# Patient Record
Sex: FEMALE | Race: WHITE | Employment: OTHER | URBAN - METROPOLITAN AREA
[De-identification: names, ages, dates, MRNs, and addresses within clinical notes are randomized per-mention and may not be internally consistent; named-entity substitution may affect disease eponyms.]

---

## 2018-06-14 ENCOUNTER — TRANSFERRED RECORDS (OUTPATIENT)
Dept: HEALTH INFORMATION MANAGEMENT | Facility: CLINIC | Age: 78
End: 2018-06-14

## 2018-06-27 ENCOUNTER — TRANSFERRED RECORDS (OUTPATIENT)
Dept: HEALTH INFORMATION MANAGEMENT | Facility: CLINIC | Age: 78
End: 2018-06-27

## 2018-06-28 ENCOUNTER — TRANSFERRED RECORDS (OUTPATIENT)
Dept: HEALTH INFORMATION MANAGEMENT | Facility: CLINIC | Age: 78
End: 2018-06-28

## 2018-06-28 ENCOUNTER — PRE VISIT (OUTPATIENT)
Dept: OTOLARYNGOLOGY | Facility: CLINIC | Age: 78
End: 2018-06-28

## 2018-06-28 NOTE — TELEPHONE ENCOUNTER
Date of appointment: 18   Diagnosis/reason for appointment: Thyroid Cancer  Referring provider/facility: Dr. Salas/[facility]  Who called:    Recent Studies  [facility] is mailing images and slides  Pathology:  Labs:  Previous chemo/radiation (if known):    Records requested/received from:    Additional information:

## 2018-07-02 PROCEDURE — 00000346 ZZHCL STATISTIC REVIEW OUTSIDE SLIDES TC 88321: Performed by: OTOLARYNGOLOGY

## 2018-07-03 LAB — COPATH REPORT: NORMAL

## 2018-07-16 ENCOUNTER — DOCUMENTATION ONLY (OUTPATIENT)
Dept: OTOLARYNGOLOGY | Facility: CLINIC | Age: 78
End: 2018-07-16

## 2018-07-16 ENCOUNTER — OFFICE VISIT (OUTPATIENT)
Dept: OTOLARYNGOLOGY | Facility: CLINIC | Age: 78
End: 2018-07-16
Payer: COMMERCIAL

## 2018-07-16 VITALS — HEIGHT: 61 IN | WEIGHT: 164 LBS | BODY MASS INDEX: 30.96 KG/M2

## 2018-07-16 DIAGNOSIS — C73 HURTHLE CELL CARCINOMA (H): ICD-10-CM

## 2018-07-16 DIAGNOSIS — C73 THYROID CANCER (H): Primary | ICD-10-CM

## 2018-07-16 RX ORDER — TIOTROPIUM BROMIDE 18 UG/1
18 CAPSULE ORAL; RESPIRATORY (INHALATION) PRN
COMMUNITY
Start: 2017-12-11

## 2018-07-16 RX ORDER — POTASSIUM CHLORIDE 750 MG/1
10 TABLET, EXTENDED RELEASE ORAL EVERY MORNING
COMMUNITY
Start: 2017-12-11

## 2018-07-16 RX ORDER — ATORVASTATIN CALCIUM 80 MG/1
80 TABLET, FILM COATED ORAL AT BEDTIME
COMMUNITY
Start: 2017-12-11

## 2018-07-16 RX ORDER — METOPROLOL TARTRATE 50 MG
50 TABLET ORAL 2 TIMES DAILY
COMMUNITY
Start: 2018-05-31

## 2018-07-16 RX ORDER — AMLODIPINE BESYLATE 5 MG/1
5 TABLET ORAL EVERY MORNING
COMMUNITY
Start: 2017-12-11

## 2018-07-16 RX ORDER — ALBUTEROL SULFATE 90 UG/1
AEROSOL, METERED RESPIRATORY (INHALATION) PRN
COMMUNITY
Start: 2018-03-05

## 2018-07-16 RX ORDER — LEVOTHYROXINE SODIUM 88 UG/1
88 TABLET ORAL EVERY MORNING
COMMUNITY
Start: 2017-12-11

## 2018-07-16 RX ORDER — FUROSEMIDE 40 MG
40 TABLET ORAL EVERY MORNING
COMMUNITY
Start: 2018-04-27

## 2018-07-16 ASSESSMENT — PAIN SCALES - GENERAL: PAINLEVEL: NO PAIN (0)

## 2018-07-16 NOTE — NURSING NOTE
Teaching Flowsheet - ENT   Relevant Diagnosis: Thyroid nodule  Teaching Topic: hemithyroidectomy   Person(s) involved in teaching: Patient and granddaughter        Motivation Level:  Asks Questions:   Yes  Eager to Learn:   Yes  Cooperative:   Yes  Receptive (willing/able to accept information):   Yes  Comments: Reviewed pre-op H and P,  NPO prior to  surgery,  pre-op scrub (given Hibiclens)  Reviewed post-op  cares , activity and pain.     Patient demonstrates understanding of the following:  Reason for the appointment, diagnosis and treatment plan:   Yes  Knowledge of proper use of medications and conditions for which they are ordered (with special attention to potential side effects or drug interactions):  stop aspirin products 1 week before surgery Yes  Which situations necessitate calling provider and whom to contact:   Yes  Nutritional needs and diet plan:   Yes  Pain management techniques:   Yes  Patient instructed on hand hygiene:  Yes  How and/when to access community resources:   Yes     Infection Prevention:  Patient   demonstrates understanding of the following:  Surgical procedure site care taught Yes  Signs and symptoms of infection taught Yes  Wound care taught Yes  Instructional Materials Used/Given: pre- op booklet,verbal  Instruction.    Elizabeth Lim, RN, BSN

## 2018-07-16 NOTE — PROGRESS NOTES
Patient scheduled for surgery on 8/7/18 [facility] with Dr Herrera while in ENT Clinic today.  Preop teaching done by the RN Coordinator. Surgery packet and soap given. PAC appointment made for 7/24/18 @ 9:30am.

## 2018-07-16 NOTE — MR AVS SNAPSHOT
After Visit Summary   7/16/2018    Hanna Mcmahon    MRN: [id number]           Patient Information     Date Of Birth          1940        Visit Information        Provider Department      7/16/2018 10:20 AM Sophie Herrera MD [facility] Ear Nose and Throat        Today's Diagnoses     Thyroid cancer (H)    -  1    Hurthle cell carcinoma (H)          Care Instructions    1. Patient is scheduled for surgery on 8/7/18. You need to arrive at the hospital at 11:00am.   2. You are scheduled for PAC appointment on 7/24 at 9:30am.   3. Patient to avoid blood thinning medications 1 week prior to surgery (Ibuprofen, Aleve, Aspirin, etc.)   4. Patient to review contents within the surgical packet & use the antiseptic scrub as directed.   5. Patient to call the ENT clinic with further questions or concerns: [phone].              Follow-ups after your visit        Your next 10 appointments already scheduled     Jul 24, 2018  9:30 AM CDT   (Arrive by 9:15 AM)   PAC EVALUATION with Beatris Arita PA-C   [facility] ([facility] [place])    [address] [place] [id number]   [phone]            Jul 24, 2018 10:30 AM CDT   (Arrive by 10:15 AM)   PAC RN ASSESSMENT with  Pac Rn   [facility] [place] ([facility] [place])    [address] [place] [address]   [phone]            Jul 24, 2018 11:00 AM CDT   (Arrive by 10:45 AM)   PAC Anesthesia Consult with  Pac Anesthesiologist   [facility] [place] ([facility] [place])    [address] [place] [address]   [phone]            Aug 07, 2018   Procedure with Sophie Herrera MD   [facility] [place], Same Day Surgery (--)    [address] [facility] [address]   [phone]            Aug 17, 2018  3:00 PM CDT   (Arrive by 2:45 PM)   Return Visit  "with Sophie Herrera MD   [facility] Ear Nose and Throat ([facility] [place])    [address] [facility]  [address] [facility] [address] [phone]              Who to contact     Please call your clinic at [phone] to:    Ask questions about your health    Make or cancel appointments    Discuss your medicines    Learn about your test results    Speak to your doctor            Additional Information About Your Visit        [employer] Information     [employer] gives you secure access to your electronic health record. If you see a primary care provider, you can also send messages to your care team and make appointments. If you have questions, please call your primary care clinic.  If you do not have a primary care provider, please call [phone] and they will assist you.      Weeleo is an electronic gateway that provides easy, online access to your medical records. With [employer], you can request a clinic appointment, read your test results, renew a prescription or communicate with your care team.     To access your existing account, please contact your [facility] or call [phone] for assistance.        Care EveryWhere ID     This is your Care EveryWhere ID. This could be used by other organizations to access your [place] medical records  [id number]        Your Vitals Were     Height BMI (Body Mass Index)                1.549 m (5' 1\") 30.99 kg/m2           Blood Pressure from Last 3 Encounters:   No data found for BP    Weight from Last 3 Encounters:   07/16/18 74.4 kg (164 lb)              We Performed the Following     IMAGESTREAM RECORDING ORDER     Sheila-Operative Worksheet (Head & Neck)        Primary Care Provider Office Phone # Fax #    Jw Jensen [phone]       [address] [address]        Equal Access to Services     FAHAD CISNEROS AH: Hadii mirian Mccormick, frank " rodriguez cardoza jessiedanica chuaservando [facility] [phone].    ATENCIÓN: Si marcie jenkins, tiene a webster disposición servicios gratuitos de asistencia lingüística. Lori al [phone].    We comply with applicable federal civil rights laws and [place] laws. We do not discriminate on the basis of race, color, national origin, age, disability, sex, sexual orientation, or gender identity.            Thank you!     Thank you for choosing [facility] EAR NOSE AND THROAT  for your care. Our goal is always to provide you with excellent care. Hearing back from our patients is one way we can continue to improve our services. Please take a few minutes to complete the written survey that you may receive in the mail after your visit with us. Thank you!             Your Updated Medication List - Protect others around you: Learn how to safely use, store and throw away your medicines at www.disposemymeds.org.          This list is accurate as of 7/16/18 11:59 PM.  Always use your most recent med list.                   Brand Name Dispense Instructions for use Diagnosis    amLODIPine 5 MG tablet    NORVASC     Take 5 mg by mouth        atorvastatin 80 MG tablet    LIPITOR     Take 80 mg by mouth        furosemide 40 MG tablet    LASIX     Take 40 mg by mouth        levothyroxine 88 MCG tablet    SYNTHROID/LEVOTHROID     Take 88 mcg by mouth        metoprolol tartrate 50 MG tablet    LOPRESSOR     Take 50 mg by mouth        mometasone-formoterol 100-5 MCG/ACT oral inhaler    DULERA     Inhale 2 puffs into the lungs        potassium chloride SA 10 MEQ CR tablet    K-DUR/KLOR-CON M     Take 10 mEq by mouth        PROAIR  (90 Base) MCG/ACT Inhaler   Generic drug:  albuterol           SM CALCIUM 500/VITAMIN D3 500-400 MG-UNIT Tabs per tablet   Generic drug:  calcium carbonate-vitamin D      Take 1 tablet by mouth        study - aspirin vs placebo 1 tablet tablet    IDS #5239     Take 81 mg by mouth         tiotropium 18 MCG capsule    SPIRIVA     Inhale 18 mcg into the lungs

## 2018-07-16 NOTE — LETTER
7/16/2018       RE: Hanna Mcmahon  [address]  [address]  [place] [facility] [address]     Dear Colleague,    Thank you for referring your patient, Hanna Mcmahon, to the [facility] EAR NOSE AND THROAT at [facility]. Please see a copy of my visit note below.    Dear Dr. Salas:    I had the pleasure of meeting Hanna Mcmahon in consultation today at the [facility] at your request.     History of Present Illness:   Hanna Mcmahon is a 78-year-old woman who is referred for evaluation of a left thyroid nodule.  She says this was incidentally found by her primary care physician on physical exam.  She had an ultrasound performed locally which showed a 4.5 cm nodule in the left thyroid gland.  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  She saw a general surgeon and ENT locally with a recommendation for a left hemithyroidectomy.  However she was recommended to undergo surgery here at the [place] due to her multiple medical comorbidities.  She has no symptoms from her thyroid tumor.  She has no changes in her voice, no difficulty with her breathing and, no problems with her swallowing.  She denies any history of radiation her neck.  She has no family history positive for thyroid cancers.    Past medical history: CML treated with Procrit injections, aortic stenosis, CAD, MI in 1985, hypertension, hyperlipidemia, polymyalgia rheumatica, appendectomy, cholecystectomy, stent in 2002 on chronic 81 mg aspirin.  She does have a history of significant bleeding following her stent placement.      Social history: Quit smoking 10 years ago was previously 1 pack per day smoker ×10 years, No chewing tobacco use, quit alcohol.  She is retired and previously made pizza for living    MEDICATIONS:     Current Outpatient Prescriptions   Medication Sig Dispense Refill     albuterol (PROAIR HFA) 108 (90  Base) MCG/ACT Inhaler        amLODIPine (NORVASC) 5 MG tablet Take 5 mg by mouth       atorvastatin (LIPITOR) 80 MG tablet Take 80 mg by mouth       calcium carbonate-vitamin D (SM CALCIUM 500/VITAMIN D3) 500-400 MG-UNIT TABS per tablet Take 1 tablet by mouth       furosemide (LASIX) 40 MG tablet Take 40 mg by mouth       levothyroxine (SYNTHROID/LEVOTHROID) 88 MCG tablet Take 88 mcg by mouth       metoprolol tartrate (LOPRESSOR) 50 MG tablet Take 50 mg by mouth       mometasone-formoterol (DULERA) 100-5 MCG/ACT oral inhaler Inhale 2 puffs into the lungs       potassium chloride SA (K-DUR/KLOR-CON M) 10 MEQ CR tablet Take 10 mEq by mouth       study - aspirin vs placebo (IDS #5239) 1 tablet tablet Take 81 mg by mouth       tiotropium (SPIRIVA) 18 MCG capsule Inhale 18 mcg into the lungs         ALLERGIES:    Allergies   Allergen Reactions     Benazepril Swelling       HABITS/SOCIAL HISTORY:   Quit smoking 10 years ago was previously 1 pack per day smoker ×10 years  No chewing tobacco use  Quit alcohol.    She is retired and previously made [employer] for living    Social History     Social History     Marital status:      Spouse name: N/A     Number of children: N/A     Years of education: N/A     Occupational History     Not on file.     Social History Main Topics     Smoking status: Former Smoker     Smokeless tobacco: Never Used     Alcohol use Not on file     Drug use: Not on file     Sexual activity: Not on file     Other Topics Concern     Not on file     Social History Narrative     No narrative on file       PAST MEDICAL HISTORY: History reviewed. No pertinent past medical history.     PAST SURGICAL HISTORY: History reviewed. No pertinent surgical history.    FAMILY HISTORY:  History reviewed. No pertinent family history.    REVIEW OF SYSTEMS:  12 point ROS was negative other than the symptoms noted above in the HPI.  Patient Supplied Answers to Review of Systems  [facility] ENT ROS 7/16/2018   Constitutional  "Weight loss   Ears, Nose, Throat Ear pain   Musculoskeletal Back pain, Neck pain         PHYSICAL EXAMINATION:   Ht 1.549 m (5' 1\")  Wt 74.4 kg (164 lb)  BMI 30.99 kg/m2   Appearance:   normal; NAD, age-appropriate appearance, well-developed, nobese   Communication:   normal; communicates verbally, normal voice quality   Head/Face:   inspection -  Normal; no scars or visible lesions   Salivary glands -  Normal size, no tenderness, swelling, or palpable masses   Facial strength -  Normal and symmetric bilateral; H/B I/VI   Skin:  normal, no rash   Ocular Motility:  normal occular movements   Ears:  auricle (AD) -  normal  EAC (AD) -  normal  TM (AD) -  Normal, no effusion  auricle (AS) -  normal  EAC (AS) -  normal  TM (AS) -  Normal, no effusion  Normal clinical speech reception   Nose:  Ext. inspection -  Normal  Internal Inspection -  Normal mucosa, septum, and turbinates   Nasopharynx:  normal mucosa, no masses   Oral Cavity:  lips -  Normal mucosa, oral competence, and stoma size   Hard palate, buccal, floor of mouth mucosa normal   Tongue - normal movement, no lesions   Oropharynx:  mucosa -  Normal, no lesions  soft palate -  Normal, no lesions, no asymmetry, normal elevation   Hypopharynx:  Normal pyriform sinus and pharyngeal wall mucosa   No pooled secretions   Larynx:  Epiglottis, false vocal cord, true vocal cord normal in appearance, bilaterally mobile cords    Neck: No visible mass or asymmetry   Normal palpation, no tenderness, no tracheal deviation  thyroid -  Palpable nodule on left   Normal range of motion   Lymphatic:  no abnormal nodes   Cardiovascular:  warm, pink, well-perfused extremities without swelling, tenderness, or edema   Respiratory:  Normal respiratory effort, no stridor   Neuro/Psych.:  mood/affect -  normal  mental status -  normal  cranial nerves -  normal        PROCEDURES:   Flexible fiberoptic laryngoscopy: Scope exam was indicated due to history of thyroid nodule. Verbal " consent was obtained. The nasal cavity was prepped with an aerosolized solution of topical anesthetic and vasoconstrictive agent. The scope was passed through the anterior nasal cavity and advanced. Inspection of the nasopharynx revealed no gross abnormality.  The scope was advanced into the posterior oropharynx and back towards the base of tongue.  There are no obvious masses or mucosal lesions.  The vallecula is clear.  The lingual and laryngeal surface of the epiglottis have no masses or mucosal lesions.  The vocal cords are mobile bilaterally with full abduction and adduction.  The piriform sinuses are clear.The airway is patent. Procedure tolerated well with no immediate complications noted.      RESULTS REVIEWED:   I reviewed the referral records from the general surgeon and ENT (Dr Elizabeth and Dr Salas) in [facility] which are summarized above    U/S Left thyroid: 4.5 cm nodule      FINAL DIAGNOSIS:   CASE FROM [place], [place] ([id number], OBTAINED   06/14/2018):   Thyroid, Left, Ultrasound-guided fine needle aspiration:   - Morphologically consistent with a follicular cell neoplasm with Hurthle   cell features     COMMENT:   We agree with the outside diagnosis.  The aspirates are hypercellular with    atypical cells with abundant   granular cytoplasm (Hurthle cell features).  There is no colloid noted in   the background.  This is a category   4 in the [place] System for Reporting Thyroid Cytopathology which is   consistent with follicular neoplasm or   suspicious for a follicular neoplasm.  Definitive evaluation for   malignancy is not possible via cytopathology.       IMPRESSION AND PLAN:   Hanna Mcmahon is a 78-year-old woman with a left-sided thyroid nodule consistent with a follicular cell lesion with Hurthle cell features.  I discussed with her that recommendations would be for surgical excision with a left hemithyroidectomy.  I discussed the risks of the procedure.  We discussed the  planned incision.  She understands she will have a drain in place postoperatively.  I discussed the risk to the recurrent laryngeal nerve as well as to the parathyroids.  Certainly if the blood supply is compromised to the parathyroids we can perform a reimplantation.  Any injury or temporary paresis of the recurrent laryngeal nerve could cause swallowing or voice issues. She understands the risk of scarring, bleeding, infection.  She understands that if there is expected findings in the final pathology she could have to have a total thyroidectomy at a separate procedure.  This surgery will be done as an outpatient procedure.  She will need to have preoperative clearance prior to surgery which we will do in our PAC clinic.  I think it be most appropriate to perform her surgery in the main operating room given her medical history.  She will need to be off aspirin prior to surgery.    Thank you very much for the opportunity to participate in the care of your patient.      Sophie Herrera M.D.  Otolaryngology- Head & Neck Surgery        CC:  Jw Jensen  [address] [address]      Juan Carlos Salas MD  [address], [address]  [place] [address]      Zay Elizabeth MD  General Surgery  [place], [place]      Again, thank you for allowing me to participate in the care of your patient.      Sincerely,    Sophie Herrera MD

## 2018-07-16 NOTE — PATIENT INSTRUCTIONS
1. Patient is scheduled for surgery on 8/7/18. You need to arrive at the hospital at 11:00am.   2. You are scheduled for PAC appointment on 7/24 at 9:30am.   3. Patient to avoid blood thinning medications 1 week prior to surgery (Ibuprofen, Aleve, Aspirin, etc.)   4. Patient to review contents within the surgical packet & use the antiseptic scrub as directed.   5. Patient to call the ENT clinic with further questions or concerns: [phone].

## 2018-07-16 NOTE — PROGRESS NOTES
Dear Dr. Salas:    I had the pleasure of meeting Hanna Mcmahon in consultation today at the [facility] at your request.     History of Present Illness:   Hanna Mcmahon is a 78-year-old woman who is referred for evaluation of a left thyroid nodule.  She says this was incidentally found by her primary care physician on physical exam.  She had an ultrasound performed locally which showed a 4.5 cm nodule in the left thyroid gland.  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  She saw a general surgeon and ENT locally with a recommendation for a left hemithyroidectomy.  However she was recommended to undergo surgery here at the [facility] due to her multiple medical comorbidities.  She has no symptoms from her thyroid tumor.  She has no changes in her voice, no difficulty with her breathing and, no problems with her swallowing.  She denies any history of radiation her neck.  She has no family history positive for thyroid cancers.    Past medical history: CML treated with Procrit injections, aortic stenosis, CAD, MI in 1985, hypertension, hyperlipidemia, polymyalgia rheumatica, appendectomy, cholecystectomy, stent in 2002 on chronic 81 mg aspirin.  She does have a history of significant bleeding following her stent placement.      Social history: Quit smoking 10 years ago was previously 1 pack per day smoker ×10 years, No chewing tobacco use, quit alcohol.  She is retired and previously made pizza for living    MEDICATIONS:     Current Outpatient Prescriptions   Medication Sig Dispense Refill     albuterol (PROAIR HFA) 108 (90 Base) MCG/ACT Inhaler        amLODIPine (NORVASC) 5 MG tablet Take 5 mg by mouth       atorvastatin (LIPITOR) 80 MG tablet Take 80 mg by mouth       calcium carbonate-vitamin D (SM CALCIUM 500/VITAMIN D3) 500-400 MG-UNIT TABS per tablet Take 1 tablet by mouth       furosemide  "(LASIX) 40 MG tablet Take 40 mg by mouth       levothyroxine (SYNTHROID/LEVOTHROID) 88 MCG tablet Take 88 mcg by mouth       metoprolol tartrate (LOPRESSOR) 50 MG tablet Take 50 mg by mouth       mometasone-formoterol (DULERA) 100-5 MCG/ACT oral inhaler Inhale 2 puffs into the lungs       potassium chloride SA (K-DUR/KLOR-CON M) 10 MEQ CR tablet Take 10 mEq by mouth       study - aspirin vs placebo (IDS #5239) 1 tablet tablet Take 81 mg by mouth       tiotropium (SPIRIVA) 18 MCG capsule Inhale 18 mcg into the lungs         ALLERGIES:    Allergies   Allergen Reactions     Benazepril Swelling       HABITS/SOCIAL HISTORY:   Quit smoking 10 years ago was previously 1 pack per day smoker ×10 years  No chewing tobacco use  Quit alcohol.    She is retired and previously made [employer] for living    Social History     Social History     Marital status:      Spouse name: N/A     Number of children: N/A     Years of education: N/A     Occupational History     Not on file.     Social History Main Topics     Smoking status: Former Smoker     Smokeless tobacco: Never Used     Alcohol use Not on file     Drug use: Not on file     Sexual activity: Not on file     Other Topics Concern     Not on file     Social History Narrative     No narrative on file       PAST MEDICAL HISTORY: History reviewed. No pertinent past medical history.     PAST SURGICAL HISTORY: History reviewed. No pertinent surgical history.    FAMILY HISTORY:  History reviewed. No pertinent family history.    REVIEW OF SYSTEMS:  12 point ROS was negative other than the symptoms noted above in the HPI.  Patient Supplied Answers to Review of Systems  [facility] ENT ROS 7/16/2018   Constitutional Weight loss   Ears, Nose, Throat Ear pain   Musculoskeletal Back pain, Neck pain         PHYSICAL EXAMINATION:   Ht 1.549 m (5' 1\")  Wt 74.4 kg (164 lb)  BMI 30.99 kg/m2   Appearance:   normal; NAD, age-appropriate appearance, well-developed, nobese   Communication:   normal; " communicates verbally, normal voice quality   Head/Face:   inspection -  Normal; no scars or visible lesions   Salivary glands -  Normal size, no tenderness, swelling, or palpable masses   Facial strength -  Normal and symmetric bilateral; H/B I/VI   Skin:  normal, no rash   Ocular Motility:  normal occular movements   Ears:  auricle (AD) -  normal  EAC (AD) -  normal  TM (AD) -  Normal, no effusion  auricle (AS) -  normal  EAC (AS) -  normal  TM (AS) -  Normal, no effusion  Normal clinical speech reception   Nose:  Ext. inspection -  Normal  Internal Inspection -  Normal mucosa, septum, and turbinates   Nasopharynx:  normal mucosa, no masses   Oral Cavity:  lips -  Normal mucosa, oral competence, and stoma size   Hard palate, buccal, floor of mouth mucosa normal   Tongue - normal movement, no lesions   Oropharynx:  mucosa -  Normal, no lesions  soft palate -  Normal, no lesions, no asymmetry, normal elevation   Hypopharynx:  Normal pyriform sinus and pharyngeal wall mucosa   No pooled secretions   Larynx:  Epiglottis, false vocal cord, true vocal cord normal in appearance, bilaterally mobile cords    Neck: No visible mass or asymmetry   Normal palpation, no tenderness, no tracheal deviation  thyroid -  Palpable nodule on left   Normal range of motion   Lymphatic:  no abnormal nodes   Cardiovascular:  warm, pink, well-perfused extremities without swelling, tenderness, or edema   Respiratory:  Normal respiratory effort, no stridor   Neuro/Psych.:  mood/affect -  normal  mental status -  normal  cranial nerves -  normal        PROCEDURES:   Flexible fiberoptic laryngoscopy: Scope exam was indicated due to history of thyroid nodule. Verbal consent was obtained. The nasal cavity was prepped with an aerosolized solution of topical anesthetic and vasoconstrictive agent. The scope was passed through the anterior nasal cavity and advanced. Inspection of the nasopharynx revealed no gross abnormality.  The scope was advanced  into the posterior oropharynx and back towards the base of tongue.  There are no obvious masses or mucosal lesions.  The vallecula is clear.  The lingual and laryngeal surface of the epiglottis have no masses or mucosal lesions.  The vocal cords are mobile bilaterally with full abduction and adduction.  The piriform sinuses are clear.The airway is patent. Procedure tolerated well with no immediate complications noted.      RESULTS REVIEWED:   I reviewed the referral records from the general surgeon and ENT (Dr Elizabeth and Dr Salas) in [facility] which are summarized above    U/S Left thyroid: 4.5 cm nodule      FINAL DIAGNOSIS:   CASE FROM [place], [place] ([id number], OBTAINED   06/14/2018):   Thyroid, Left, Ultrasound-guided fine needle aspiration:   - Morphologically consistent with a follicular cell neoplasm with Hurthle   cell features     COMMENT:   We agree with the outside diagnosis.  The aspirates are hypercellular with    atypical cells with abundant   granular cytoplasm (Hurthle cell features).  There is no colloid noted in   the background.  This is a category   4 in the [facility] System for Reporting Thyroid Cytopathology which is   consistent with follicular neoplasm or   suspicious for a follicular neoplasm.  Definitive evaluation for   malignancy is not possible via cytopathology.       IMPRESSION AND PLAN:   Hanna Mcmahon is a 78-year-old woman with a left-sided thyroid nodule consistent with a follicular cell lesion with Hurthle cell features.  I discussed with her that recommendations would be for surgical excision with a left hemithyroidectomy.  I discussed the risks of the procedure.  We discussed the planned incision.  She understands she will have a drain in place postoperatively.  I discussed the risk to the recurrent laryngeal nerve as well as to the parathyroids.  Certainly if the blood supply is compromised to the parathyroids we can perform a reimplantation.  Any injury or  temporary paresis of the recurrent laryngeal nerve could cause swallowing or voice issues. She understands the risk of scarring, bleeding, infection.  She understands that if there is expected findings in the final pathology she could have to have a total thyroidectomy at a separate procedure.  This surgery will be done as an outpatient procedure.  She will need to have preoperative clearance prior to surgery which we will do in our PAC clinic.  I think it be most appropriate to perform her surgery in the main operating room given her medical history.  She will need to be off aspirin prior to surgery.    Thank you very much for the opportunity to participate in the care of your patient.      Sophie Herrera M.D.  Otolaryngology- Head & Neck Surgery        CC:  Jw Jensen  [address] [address]      Juan Carlos Salas MD  [address], [address]  [place] [address]      Zay Elizabeth MD  General Surgery  [place], [place]

## 2018-07-23 ENCOUNTER — ANESTHESIA EVENT (OUTPATIENT)
Dept: SURGERY | Facility: CLINIC | Age: 78
End: 2018-07-23
Payer: COMMERCIAL

## 2018-07-24 ENCOUNTER — OFFICE VISIT (OUTPATIENT)
Dept: SURGERY | Facility: CLINIC | Age: 78
End: 2018-07-24
Payer: COMMERCIAL

## 2018-07-24 ENCOUNTER — ALLIED HEALTH/NURSE VISIT (OUTPATIENT)
Dept: SURGERY | Facility: CLINIC | Age: 78
End: 2018-07-24
Payer: COMMERCIAL

## 2018-07-24 ENCOUNTER — APPOINTMENT (OUTPATIENT)
Dept: SURGERY | Facility: CLINIC | Age: 78
End: 2018-07-24
Payer: COMMERCIAL

## 2018-07-24 VITALS
WEIGHT: 163.9 LBS | DIASTOLIC BLOOD PRESSURE: 74 MMHG | RESPIRATION RATE: 18 BRPM | HEART RATE: 60 BPM | HEIGHT: 61 IN | TEMPERATURE: 97.5 F | OXYGEN SATURATION: 95 % | SYSTOLIC BLOOD PRESSURE: 137 MMHG | BODY MASS INDEX: 30.94 KG/M2

## 2018-07-24 DIAGNOSIS — Z01.818 PREOP EXAMINATION: Primary | ICD-10-CM

## 2018-07-24 DIAGNOSIS — D34 HURTHLE CELL ADENOMA OF THYROID: ICD-10-CM

## 2018-07-24 LAB
CREAT SERPL-MCNC: 1.17 MG/DL (ref 0.52–1.04)
GFR SERPL CREATININE-BSD FRML MDRD: 45 ML/MIN/1.7M2
POTASSIUM SERPL-SCNC: 3.6 MMOL/L (ref 3.4–5.3)

## 2018-07-24 ASSESSMENT — LIFESTYLE VARIABLES: TOBACCO_USE: 1

## 2018-07-24 ASSESSMENT — COPD QUESTIONNAIRES: COPD: 1

## 2018-07-24 NOTE — MR AVS SNAPSHOT
After Visit Summary   2018    Hanna Mcmahon    MRN: [id number]           Patient Information     Date Of Birth          1940        Visit Information        Provider Department      2018 10:30 AM Rn, [facility]        Care Instructions    Preparing for Your Surgery      Name:  Hanna Mcmahon   MRN:  [id number]   :  1940   Today's Date:  2018     Arriving for surgery:  Surgery date:  18  Arrival time:  11:15 am    Please come to:   [facility] [address]  [address] [place]  [address]    -   parking is available in front of the hospital from 5:15 am to 8:00 pm    -  Stop at the Information Desk in the lobby    -   Inform the information person that you are here for surgery. An escort to 3C will be provided. If you would not like an escort, please proceed to 3C on the 3rd floor. [phone]     -  Bring your ID and insurance card.    What can I eat or drink?  -  You may have solid food or milk products until 8 hours prior to your surgery. (Until 5:15 am )  -  You may have water, apple juice, clear BLACK coffee (NO creamer or nondairy creamer), or 7up/Sprite until 2 hours prior to your surgery. (Until 11:15 am )    Which medicines can I take?       -  Do not bring your own medications to the hospital, except for inhalers.        -  Follow Otolaryngology Clinic instructions regarding Ibuprofen. If no instructions given, NO Ibuprofen the day prior to surgery.         -  Hold Vitamins and Supplements 7 days prior to surgery.    -  Do NOT take these medications in the morning, the day of surgery:  Furosemide, Potassium,     -  Please take these medications the morning of surgery:  Metoprolol, Amlodipine, Levothyroxine,       Albuterol inhaler if needed, Dulera inhaler if needed, Spiriva if needed      Acetaminophen (Tylenol) if needed    How do I prepare myself?  -  Take two showers: one the night  before surgery; and one the morning of surgery.         Use Scrubcare or Hibiclens to wash from neck down.  You may use your own shampoo and conditioner. No other hair products.   -  Do NOT use lotion, powder, colognes, deodorant, or antiperspirant the day of your surgery.  -  Do NOT wear any makeup, fingernail polish or jewelry.    Questions or Concerns:  If you have questions or concerns prior to your surgery, call [phone]. (Mon - Fri   8 am- 5:30 pm)  Questions after surgery, contact your Surgeons office.      AFTER YOUR SURGERY  Breathing exercises   Breathing exercises help you recover faster. Take deep breaths and let the air out slowly. This will:     Help you wake up after surgery.    Help prevent complications like pneumonia.  Preventing complications will help you go home sooner.   We may give you a breathing device (incentive spirometer) to encourage you to breathe deeply.   Nausea and vomiting   You may feel sick to your stomach after surgery; if so, let your nurse know.    Pain control:  After surgery, you may have pain. Our goal is to help you manage your pain. Pain medicine will help you feel comfortable enough to do activities that will help you heal.  These activities may include breathing exercises, walking and physical therapy.   To help your health care team treat your pain we will ask: 1) If you have pain  2) where it is located 3) describe your pain in your words  Methods of pain control include medications given by mouth, vein or by nerve block for some surgeries.  Sequential Compression Device (SCD) or Pneumo Boots:  You may need to wear SCD S on your legs or feet. These are wraps connected to a machine that pumps in air and releases it. The repeated pumping helps prevent blood clots from forming.                     Follow-ups after your visit        Your next 10 appointments already scheduled     Jul 24, 2018 11:00 AM CDT   (Arrive by 10:45 AM)   PAC Anesthesia Consult with  Pac  Anesthesiologist   [facility] ([facility])    [address]  4th Floor  [facility] [id number] [phone]            Aug 07, 2018   Procedure with Sophie Herrera MD   [facility] [place] Same Day Surgery (--)    [address]  [place] [address]   [phone]            Aug 17, 2018  3:00 PM CDT   (Arrive by 2:45 PM)   Return Visit with Sophie Herrera MD   [facility] Ear Nose [facility] Throat ([facility])    [address]  4th [facility] [id number] [phone]              Future tests that were ordered for you today     Open Future Orders        Priority Expected Expires Ordered    Creatinine Routine 7/24/2018 8/23/2018 7/24/2018    Potassium Routine 7/24/2018 8/23/2018 7/24/2018            Who to contact     Please call your clinic at [phone] to:    Ask questions about your health    Make or cancel appointments    Discuss your medicines    Learn about your test results    Speak to your doctor            Additional Information About Your Visit        PocketGuide Information     PocketGuide gives you secure access to your electronic health record. If you see a primary care provider, you can also send messages to your care team and make appointments. If you have questions, please call your primary care clinic.  If you do not have a primary care provider, please call [phone] and they will assist you.      PocketGuide is an electronic gateway that provides easy, online access to your medical records. With PocketGuide, you can request a clinic appointment, read your test results, renew a prescription or communicate with your care team.     To access your existing account, please contact your [facility] or call [phone] for assistance.        Care EveryWhere ID     This is your Care EveryWhere ID. This could be used by other organizations to access your [facility]  records  [id number]         Blood Pressure from Last 3 Encounters:   07/24/18 137/74    Weight from Last 3 Encounters:   07/24/18 74.3 kg (163 lb 14.4 oz)   07/16/18 74.4 kg (164 lb)              Today, you had the following     No orders found for display       Primary Care Provider Office Phone # Fax #    Jw Jensen [phone]       [facility] [address] [address]        Equal Access to Services     CHARLES CISNEROS : Hadii aad ku hadasho Soomaali, waaxda luqadaha, qaybta kaalmada adeegyada, waxay idiin hayaan adeeg kharaanita rivers . So [facility] [phone].    ATENCIÓN: Si habla español, tiene a webster disposición servicios gratuitos de asistencia lingüística. [facility] [phone].    We comply with applicable federal civil rights laws and [place] laws. We do not discriminate on the basis of race, color, national origin, age, disability, sex, sexual orientation, or gender identity.            Thank you!     Thank you for choosing [facility]  for your care. Our goal is always to provide you with excellent care. Hearing back from our patients is one way we can continue to improve our services. Please take a few minutes to complete the written survey that you may receive in the mail after your visit with us. Thank you!             Your Updated Medication List - Protect others around you: Learn how to safely use, store and throw away your medicines at www.disposemymeds.org.          This list is accurate as of 7/24/18 10:34 AM.  Always use your most recent med list.                   Brand Name Dispense Instructions for use Diagnosis    amLODIPine 5 MG tablet    NORVASC     Take 5 mg by mouth every morning        atorvastatin 80 MG tablet    LIPITOR     Take 80 mg by mouth At Bedtime        furosemide 40 MG tablet    LASIX     Take 40 mg by mouth every morning        levothyroxine 88 MCG tablet    SYNTHROID/LEVOTHROID     Take 88 mcg by mouth every  morning        metoprolol tartrate 50 MG tablet    LOPRESSOR     Take 50 mg by mouth 2 times daily        mometasone-formoterol 100-5 MCG/ACT oral inhaler    DULERA     Inhale 2 puffs into the lungs as needed        potassium chloride SA 10 MEQ CR tablet    K-DUR/KLOR-CON M     Take 10 mEq by mouth every morning        PROAIR  (90 Base) MCG/ACT Inhaler   Generic drug:  albuterol      Inhale into the lungs as needed        SM CALCIUM 500/VITAMIN D3 500-400 MG-UNIT Tabs per tablet   Generic drug:  calcium carbonate-vitamin D      Take 1 tablet by mouth every morning        study - aspirin vs placebo 1 tablet tablet    IDS #5239     Take 81 mg by mouth every morning        tiotropium 18 MCG capsule    SPIRIVA     Inhale 18 mcg into the lungs as needed        VITAMIN D (CHOLECALCIFEROL) PO      Take by mouth every morning

## 2018-07-24 NOTE — ANESTHESIA PREPROCEDURE EVALUATION
Anesthesia Evaluation     . Pt has had prior anesthetic. Type: General    No history of anesthetic complications          ROS/MED HX    ENT/Pulmonary:     (+)tobacco use, Past use COPD, , . .    Neurologic:  - neg neurologic ROS     Cardiovascular:     (+) Dyslipidemia, hypertension--CAD, --stent,1980  1 . : . . . :. valvular problems/murmurs type: AS Moderate:. Previous cardiac testing Echodate:5/17/18results:Stress Testdate:6/2014 results: date: results: date: results:          METS/Exercise Tolerance:  >4 METS   Hematologic:     (+) History of Transfusion no previous transfusion reaction Other Hematologic Disorder-CML in remission      Musculoskeletal:   (+) , , other musculoskeletal- Chronic Back and left leg pain      GI/Hepatic:        (-) GERD   Renal/Genitourinary:  - ROS Renal section negative       Endo:  - neg endo ROS       Psychiatric:  - neg psychiatric ROS       Infectious Disease:  - neg infectious disease ROS       Malignancy:   (+) Malignancy History of Lymphoma/Leukemia and Other  Lymph CA Remission status post, Other CA Hurthle cell carcinoma status post         Other:    (+) no H/O Chronic Pain,                   Physical Exam  Normal systems: pulmonary    Airway   Mallampati: II  TM distance: >3 FB  Neck ROM: full    Dental   (+) upper dentures and lower dentures    Cardiovascular   Rhythm and rate: regular and normal  (+) murmur       Pulmonary    breath sounds clear to auscultation               PAC Discussion and Assessment    ASA Classification: 3  Case is suitable for: [facility]  Anesthetic techniques and relevant risks discussed: GA  Invasive monitoring and risk discussed:   Types:   Possibility and Risk of blood transfusion discussed:   NPO instructions given:   Additional anesthetic preparation and risks discussed:   Needs early admission to pre-op area:   Other:     PAC Resident/NP Anesthesia Assessment:  Hanna Mcmahon is a 78 year old female who presents for pre-operative H & P in  preparation for a Left Hemithyroidectomy on 8/7/18 with Dr. Herrera for hurthle cell carcinoma at the [facility].     PAC referral for risk assessment and optimization for anesthesia with comorbid conditions of:    Pre-operative considerations:  1.  Cardiovascular:  Functional status METS >4   Risk of Major Adverse Cardiac event: 0.9%  -MI s/p stent in 1980's, CAD, mild to mod aortic stenosis  -PAD s/p internal iliac artery stenting in 2012, AAA repair 2012  -ECHO 5/17/18:  Final Impressions:   1. Technically limited exam.   2. Normal LV size, mildly increased wall thickness, normal global systolic function with an estimated EF of 65 - 70%.   3. Right ventricular cavity size is normal, global systolic RV function is normal.   4. Mildly enlarged left atrium.   5. The aortic valve is trileaflet and calcified, mild to moderate stenosis and no regurgitation. The aortic valve peak velocity is 2.50 m/s, the peak gradient is 25.0 mmHg, and the mean gradient is 13.5 mmHg. The aortic valve area is 1.33 cmÂ .   6. Grade 1 pattern of LV diastolic filling.  Comparison  Compared to prior exam report of 7/14/14, there has been no significant change.  -NM Cardiac Stress Test 2014:  FINDINGS:  There is good uptake of activity by the left ventricle.  No   left ventricular enlargement is noted.      There is a medium-sized area of moderately decreased activity in the base   and mid inferior and inferolateral walls.  This is predominantly fixed on   the resting images consistent with infarction.  There is mild peripheral   reversibility surrounding this area consistent with mild serenity-infarction   ischemia.      No other significant fixed or reversible defects are identified.    The gated images demonstrate a normal left ventricular ejection fraction   of 66 percent.  There is mild hypokinesis in the base and mid   inferolateral wall.   -EKG 4/27/18:  SR with 1st degree AV Block with occasional  PVC, inferior infarct age undetermined  2.  Pulm:     -FERNANDO risk:  Low  -COPD, mild  Rarely uses inhalers  -45 pack year smoking history, quit 2008  3.  GI:  Risk of PONV score = 3 .  If > 2, anti-emetic intervention recommended.  4.  Heme/Onc:  -CML in remission  4.  Meds:  -Antiplts:  Asa 81 mg, stopped 7/18/18    Discussed the above A/P with Dr. Ring.  Patient is optimized and is an acceptable candidate for the proposed procedure.  No further diagnostic evaluation is needed.    Beatris GARCIA-DMITRI  07/24/18 11:38 AM        Mid-Level Provider/Resident:   Date:   Time:     Attending Anesthesiologist Anesthesia Assessment:  78 year old for left thyroidectomy for mass, cancer. Patient has known mild COPD, but does not use her inhalers, and is clear today without wheezing. Known CAD with MI and stent in 1980's, PAD as well with iliac stenting and AAA repair 2012. Echo 5/18 shows normal LV and RV function, mild to moderate aortic stenosis, EF 65-70%.     Patient/case discussed with MARCUS. No need to see patient. Patient is appropriate for the planned procedure without further work-up or medical management.      Reviewed and Signed by PAC Anesthesiologist  Anesthesiologist: katie  Date: 7/24/2018  Time:   Pass/Fail: Pass  Disposition:     PAC Pharmacist Assessment:        Pharmacist:   Date:   Time:      Anesthesia Plan      History & Physical Review  History and physical reviewed and following examination; no interval change.    ASA Status:  3 .    NPO Status:  > 6 hours    Plan for General and ETT with Intravenous and Propofol induction. Maintenance will be Balanced.    PONV prophylaxis:  Ondansetron (or other 5HT-3) and Dexamethasone or Solumedrol  Additional equipment: 2nd IV and Videolaryngoscope      Postoperative Care  Postoperative pain management:  IV analgesics.      Consents  Anesthetic plan, risks, benefits and alternatives discussed with:  Patient..                  History and physical assessed; Patient  examined.   Risks and alternatives presented and discussed. Patient and family agree. All questions answered.      James Colorado MD  Staff Anesthesiologist  *[phone]

## 2018-07-24 NOTE — PATIENT INSTRUCTIONS
Preparing for Your Surgery      Name:  Hanna Mcmahon   MRN:  [id number]   :  1940   Today's Date:  2018     Arriving for surgery:  Surgery date:  18  Arrival time:  11:15 am    Please come to:   [facility] [address]  [address] [place]  [address]    -   parking is available in front of the hospital from 5:15 am to 8:00 pm    -  Stop at the Information Desk in the lobby    -   Inform the information person that you are here for surgery. An escort to 3C will be provided. If you would not like an escort, please proceed to 3C on the 3rd floor. [phone]     -  Bring your ID and insurance card.    What can I eat or drink?  -  You may have solid food or milk products until 8 hours prior to your surgery. (Until 5:15 am )  -  You may have water, apple juice, clear BLACK coffee (NO creamer or nondairy creamer), or 7up/Sprite until 2 hours prior to your surgery. (Until 11:15 am )    Which medicines can I take?       -  Do not bring your own medications to the hospital, except for inhalers.        -  Follow Otolaryngology Clinic instructions regarding Ibuprofen. If no instructions given, NO Ibuprofen the day prior to surgery.         -  Hold Vitamins and Supplements 7 days prior to surgery.    -  Do NOT take these medications in the morning, the day of surgery:  Furosemide, Potassium,     -  Please take these medications the morning of surgery:  Metoprolol, Amlodipine, Levothyroxine,       Albuterol inhaler if needed, Dulera inhaler if needed, Spiriva if needed      Acetaminophen (Tylenol) if needed    How do I prepare myself?  -  Take two showers: one the night before surgery; and one the morning of surgery.         Use Scrubcare or Hibiclens to wash from neck down.  You may use your own shampoo and conditioner. No other hair products.   -  Do NOT use lotion, powder, colognes, deodorant, or antiperspirant the day of your surgery.  -  Do NOT wear any makeup,  fingernail polish or jewelry.    Questions or Concerns:  If you have questions or concerns prior to your surgery, call [phone]. (Mon - Fri   8 am- 5:30 pm)  Questions after surgery, contact your Surgeons office.      AFTER YOUR SURGERY  Breathing exercises   Breathing exercises help you recover faster. Take deep breaths and let the air out slowly. This will:     Help you wake up after surgery.    Help prevent complications like pneumonia.  Preventing complications will help you go home sooner.   We may give you a breathing device (incentive spirometer) to encourage you to breathe deeply.   Nausea and vomiting   You may feel sick to your stomach after surgery; if so, let your nurse know.    Pain control:  After surgery, you may have pain. Our goal is to help you manage your pain. Pain medicine will help you feel comfortable enough to do activities that will help you heal.  These activities may include breathing exercises, walking and physical therapy.   To help your health care team treat your pain we will ask: 1) If you have pain  2) where it is located 3) describe your pain in your words  Methods of pain control include medications given by mouth, vein or by nerve block for some surgeries.  Sequential Compression Device (SCD) or Pneumo Boots:  You may need to wear SCD S on your legs or feet. These are wraps connected to a machine that pumps in air and releases it. The repeated pumping helps prevent blood clots from forming.

## 2018-07-24 NOTE — H&P
Pre-Operative H & P     CC:  Preoperative exam to assess for increased cardiopulmonary risk while undergoing surgery and anesthesia.    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 Herrera for hurthle cell carcinoma at the [facility].      Ms. Mcmahon has a left thyroid nodule that was incidentally found by her primary care physician on physical exam.  She had an ultrasound performed  which showed a 4.5 cm nodule in the left thyroid gland.  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  Her other significant medical history includes a MI in the 1980's /sp stent, CAD, mild to mod aortic stenosis, PAD s/p internal iliac artery stenting and AAA repair in 2012.  She does have a history of significant bleeding following her stent placement in 2012 and was subsequently diagnosed with CML that is  now in remission.  She also is diagnosed with COPD, but rarely uses an inhaler.  She is active with her walker going up and down hills, walking daily.    History is obtained from the patient, her granddaughter and  medical record including Care Everywhere.        Past Medical History  Past Medical History:   Diagnosis Date     Aortic stenosis 01/2018    mild to mod     CAD (coronary artery disease)      CML (chronic myelocytic leukemia) (H)      COPD (chronic obstructive pulmonary disease) (H)      GERD (gastroesophageal reflux disease)      History of acute myocardial infarction 1980    s/p stent     Hyperlipidemia      Hypertension      PAD (peripheral artery disease) (H)     s/p internal iliac artery stent and AAA repair in 2012      Polymyalgia rheumatica (H)      Restless legs syndrome         Past Surgical History  Past Surgical History:   Procedure Laterality Date     AAA REPAIR       APPENDECTOMY       CHOLECYSTECTOMY       ENDOSCOPIC RETROGRADE CHOLANGIOPANCREATOGRAPHY       internal iliac artery stent       STENT, CORONARY, ARMAND       UPPER EUS         Hx of Blood transfusions/reactions: No reaction to previous blood transfusion      Personal or FH with difficulty with Anesthesia:  None    Prior to Admission Medications  Current Outpatient Prescriptions   Medication Sig Dispense Refill     amLODIPine (NORVASC) 5 MG tablet Take 5 mg by mouth every morning        atorvastatin (LIPITOR) 80 MG tablet Take 80 mg by mouth At Bedtime        calcium carbonate-vitamin D (SM CALCIUM 500/VITAMIN D3) 500-400 MG-UNIT TABS per tablet Take 1 tablet by mouth every morning        furosemide (LASIX) 40 MG tablet Take 40 mg by mouth every morning        levothyroxine (SYNTHROID/LEVOTHROID) 88 MCG tablet Take 88 mcg by mouth every morning        metoprolol tartrate (LOPRESSOR) 50 MG tablet Take 50 mg by mouth 2 times daily        potassium chloride SA (K-DUR/KLOR-CON M) 10 MEQ CR tablet Take 10 mEq by mouth every morning        study - aspirin vs placebo (IDS #5239) 1 tablet tablet Take 81 mg by mouth every morning        VITAMIN D, CHOLECALCIFEROL, PO Take by mouth every morning       albuterol (PROAIR HFA) 108 (90 Base) MCG/ACT Inhaler Inhale into the lungs as needed        mometasone-formoterol (DULERA) 100-5 MCG/ACT oral inhaler Inhale 2 puffs into the lungs as needed        tiotropium (SPIRIVA) 18 MCG capsule Inhale 18 mcg into the lungs as needed            Allergies  Benazepril     Social History  Social History     Social History     Marital status:      Spouse name: N/A     Number of children: N/A     Years of education: N/A     Occupational History     Not on file.     Social History Main Topics     Smoking status: Former Smoker      "Packs/day: 1.50     Years: 30.00     Quit date: 7/24/2008     Smokeless tobacco: Never Used     Alcohol use Yes      Comment: rare     Drug use: Not on file     Sexual activity: Not on file     Other Topics Concern     Not on file     Social History Narrative          Family History  No family history of bleeding, clotting disorders or complications with anesthesia.    ROS/MED HX     ENT/Pulmonary:     (+)tobacco use, Past use COPD, , . .    Neurologic:  - neg neurologic ROS     Cardiovascular:     (+) Dyslipidemia, hypertension--CAD, --stent,1980  1 . : . . . :. . Previous cardiac testing Echodate:5/17/18results:Stress Testdate:6/2014 results: date: results: date: results:        METS/Exercise Tolerance:  >4 METS   Hematologic:     (+) History of Transfusion no previous transfusion reaction Other Hematologic Disorder-CML in remission    Musculoskeletal:   (+) , , other musculoskeletal- Chronic Back and left leg pain    GI/Hepatic:       Renal/Genitourinary:  - ROS Renal section negative     Endo:  - neg endo ROS     Psychiatric:  - neg psychiatric ROS     Infectious Disease:  - neg infectious disease ROS     Malignancy:   (+) Malignancy History of Lymphoma/Leukemia and Other  Lymph CA Remission status post, Other CA Hurthle cell carcinoma status post       Other:    (+) no H/O Chronic Pain,         Cardiology Tests: (personally reviewed):   Review Results Below in A/P    Labs: (personally reviewed):  Outside Labs 5/3/18:  Hgb 11.6      Lab Results   Component Value Date    POTASSIUM 3.6 07/24/2018    CR 1.17 (H) 07/24/2018          Physical Exam:  No LMP recorded.   Vital signs:  /74  Pulse 60  Temp 97.5  F (36.4  C) (Oral)  Resp 18  Ht 1.549 m (5' 1\")  Wt 74.3 kg (163 lb 14.4 oz)  SpO2 95%  BMI 30.97 kg/m2    Constitutional: Awake, alert, cooperative, no apparent distress, and appears stated age.  Eyes: Pupils equal, round and reactive to light, sclera clear, conjunctiva normal.  HENT: " Normocephalic, oral pharynx with moist mucus membranes. No goiter appreciated.   Respiratory: Clear to auscultation bilaterally, no crackles or wheezing.  Cardiovascular: Regular rate and rhythm and  overt murmur noted.  No carotid bruits auscultated. No edema. Palpable pulses to radial  DP and PT arteries.   GI: Normal bowel sounds, soft, non-distended, non-tender, no masses palpated  Skin: Warm and dry.  No rashes at anticipated surgical site.   Musculoskeletal: Full extension of the neck.  No redness, warmth, or swelling of exposed joints noted. Gross motor strength is normal.    Neurologic: Awake, alert, oriented to name, place and time.  Gait is normal.   Neuropsychiatric: Calm, cooperative. Normal affect.     Assessment/Plan  Hanna Mcmahon is a 78 year old female who presents for pre-operative H & P in preparation for a Left Hemithyroidectomy on 8/7/18 with Dr. Herrera for hurthle cell carcinoma at the [facility].     PAC referral for risk assessment and optimization for anesthesia with comorbid conditions of:    Pre-operative considerations:  1.  Cardiovascular:  Functional status METS >4   Risk of Major Adverse Cardiac event: 0.9%  -MI s/p stent in 1980's, CAD, mild to mod aortic stenosis  -PAD s/p internal iliac artery stenting in 2012, AAA repair 2012  -ECHO 5/17/18:  Final Impressions:   1. Technically limited exam.   2. Normal LV size, mildly increased wall thickness, normal global systolic function with an estimated EF of 65 - 70%.   3. Right ventricular cavity size is normal, global systolic RV function is normal.   4. Mildly enlarged left atrium.   5. The aortic valve is trileaflet and calcified, mild to moderate stenosis and no regurgitation. The aortic valve peak velocity is 2.50 m/s, the peak gradient is 25.0 mmHg, and the mean gradient is 13.5 mmHg. The aortic valve area is 1.33 cmÂ .   6. Grade 1 pattern of LV diastolic filling.  Comparison  Compared to  prior exam report of 7/14/14, there has been no significant change.  -NM Cardiac Stress Test 2014:  FINDINGS:  There is good uptake of activity by the left ventricle.  No   left ventricular enlargement is noted.      There is a medium-sized area of moderately decreased activity in the base   and mid inferior and inferolateral walls.  This is predominantly fixed on   the resting images consistent with infarction.  There is mild peripheral   reversibility surrounding this area consistent with mild serenity-infarction   ischemia.      No other significant fixed or reversible defects are identified.    The gated images demonstrate a normal left ventricular ejection fraction   of 66 percent.  There is mild hypokinesis in the base and mid   inferolateral wall.   -EKG 4/27/18:  SR with 1st degree AV Block with occasional PVC, inferior infarct age undetermined  2.  Pulm:     -FERNANDO risk:  Low  -COPD, mild  Rarely uses inhalers  -45 pack year smoking history, quit 2008  3.  GI:  Risk of PONV score = 3 .  If > 2, anti-emetic intervention recommended.  4.  Heme/Onc:  -CML in remission  4.  Meds:  -Antiplts:  Asa 81 mg, stopped 7/18/18    Discussed the above A/P with Dr. Ring.  Patient is optimized and is an acceptable candidate for the proposed procedure.  No further diagnostic evaluation is needed.      AVS given to patient regarding medication instructions,  surgery time/arrival time and NPO status.  Beatris Arita MS PA-C   Preoperative Assessment Center  [facility]  [facility]  Phone: [phone]  Fax: [phone]

## 2018-08-07 ENCOUNTER — HOSPITAL ENCOUNTER (OUTPATIENT)
Facility: CLINIC | Age: 78
Setting detail: OBSERVATION
Discharge: HOME | End: 2018-08-08
Attending: OTOLARYNGOLOGY | Admitting: OTOLARYNGOLOGY
Payer: COMMERCIAL

## 2018-08-07 ENCOUNTER — ANESTHESIA (OUTPATIENT)
Dept: SURGERY | Facility: CLINIC | Age: 78
End: 2018-08-07
Payer: COMMERCIAL

## 2018-08-07 ENCOUNTER — SURGERY (OUTPATIENT)
Age: 78
End: 2018-08-07

## 2018-08-07 DIAGNOSIS — G89.18 POSTOPERATIVE PAIN: Primary | ICD-10-CM

## 2018-08-07 PROBLEM — C73: Status: ACTIVE | Noted: 2018-08-07

## 2018-08-07 LAB
GLUCOSE BLDC GLUCOMTR-MCNC: 84 MG/DL (ref 70–99)
HGB BLD-MCNC: 11.6 G/DL (ref 11.7–15.7)
INR PPP: 1.22 (ref 0.86–1.14)
POTASSIUM SERPL-SCNC: 3.6 MMOL/L (ref 3.4–5.3)

## 2018-08-07 PROCEDURE — 25000125 ZZHC RX 250: Performed by: OTOLARYNGOLOGY

## 2018-08-07 PROCEDURE — 25000128 H RX IP 250 OP 636: Performed by: NURSE ANESTHETIST, CERTIFIED REGISTERED

## 2018-08-07 PROCEDURE — 71000013 ZZH RECOVERY PHASE 1 LEVEL 1 EA ADDTL HR: Performed by: OTOLARYNGOLOGY

## 2018-08-07 PROCEDURE — 37000008 ZZH ANESTHESIA TECHNICAL FEE, 1ST 30 MIN: Performed by: OTOLARYNGOLOGY

## 2018-08-07 PROCEDURE — 84132 ASSAY OF SERUM POTASSIUM: CPT | Performed by: ANESTHESIOLOGY

## 2018-08-07 PROCEDURE — 85610 PROTHROMBIN TIME: CPT | Performed by: ANESTHESIOLOGY

## 2018-08-07 PROCEDURE — 25000132 ZZH RX MED GY IP 250 OP 250 PS 637: Performed by: ANESTHESIOLOGY

## 2018-08-07 PROCEDURE — 88342 IMHCHEM/IMCYTCHM 1ST ANTB: CPT | Performed by: OTOLARYNGOLOGY

## 2018-08-07 PROCEDURE — 36415 COLL VENOUS BLD VENIPUNCTURE: CPT | Performed by: ANESTHESIOLOGY

## 2018-08-07 PROCEDURE — 27210794 ZZH OR GENERAL SUPPLY STERILE: Performed by: OTOLARYNGOLOGY

## 2018-08-07 PROCEDURE — 25000125 ZZHC RX 250: Performed by: NURSE ANESTHETIST, CERTIFIED REGISTERED

## 2018-08-07 PROCEDURE — 88307 TISSUE EXAM BY PATHOLOGIST: CPT | Performed by: OTOLARYNGOLOGY

## 2018-08-07 PROCEDURE — 25000132 ZZH RX MED GY IP 250 OP 250 PS 637: Performed by: NURSE ANESTHETIST, CERTIFIED REGISTERED

## 2018-08-07 PROCEDURE — 93010 ELECTROCARDIOGRAM REPORT: CPT | Performed by: INTERNAL MEDICINE

## 2018-08-07 PROCEDURE — 00000146 ZZHCL STATISTIC GLUCOSE BY METER IP

## 2018-08-07 PROCEDURE — P9041 ALBUMIN (HUMAN),5%, 50ML: HCPCS | Performed by: NURSE ANESTHETIST, CERTIFIED REGISTERED

## 2018-08-07 PROCEDURE — 40000065 ZZH STATISTIC EKG NON-CHARGEABLE

## 2018-08-07 PROCEDURE — 25000566 ZZH SEVOFLURANE, EA 15 MIN: Performed by: OTOLARYNGOLOGY

## 2018-08-07 PROCEDURE — 25000128 H RX IP 250 OP 636: Performed by: OTOLARYNGOLOGY

## 2018-08-07 PROCEDURE — 25000132 ZZH RX MED GY IP 250 OP 250 PS 637: Performed by: OTOLARYNGOLOGY

## 2018-08-07 PROCEDURE — 37000009 ZZH ANESTHESIA TECHNICAL FEE, EACH ADDTL 15 MIN: Performed by: OTOLARYNGOLOGY

## 2018-08-07 PROCEDURE — 36000062 ZZH SURGERY LEVEL 4 1ST 30 MIN - UMMC: Performed by: OTOLARYNGOLOGY

## 2018-08-07 PROCEDURE — G0378 HOSPITAL OBSERVATION PER HR: HCPCS

## 2018-08-07 PROCEDURE — 85018 HEMOGLOBIN: CPT | Performed by: ANESTHESIOLOGY

## 2018-08-07 PROCEDURE — 40000170 ZZH STATISTIC PRE-PROCEDURE ASSESSMENT II: Performed by: OTOLARYNGOLOGY

## 2018-08-07 PROCEDURE — 25000128 H RX IP 250 OP 636: Performed by: ANESTHESIOLOGY

## 2018-08-07 PROCEDURE — 36000064 ZZH SURGERY LEVEL 4 EA 15 ADDTL MIN - UMMC: Performed by: OTOLARYNGOLOGY

## 2018-08-07 PROCEDURE — 71000012 ZZH RECOVERY PHASE 1 LEVEL 1 FIRST HR: Performed by: OTOLARYNGOLOGY

## 2018-08-07 RX ORDER — CEFAZOLIN SODIUM 2 G/100ML
2 INJECTION, SOLUTION INTRAVENOUS
Status: COMPLETED | OUTPATIENT
Start: 2018-08-07 | End: 2018-08-07

## 2018-08-07 RX ORDER — HYDROMORPHONE HYDROCHLORIDE 1 MG/ML
.3-.5 INJECTION, SOLUTION INTRAMUSCULAR; INTRAVENOUS; SUBCUTANEOUS EVERY 5 MIN PRN
Status: DISCONTINUED | OUTPATIENT
Start: 2018-08-07 | End: 2018-08-07

## 2018-08-07 RX ORDER — SODIUM CHLORIDE, SODIUM LACTATE, POTASSIUM CHLORIDE, CALCIUM CHLORIDE 600; 310; 30; 20 MG/100ML; MG/100ML; MG/100ML; MG/100ML
INJECTION, SOLUTION INTRAVENOUS CONTINUOUS
Status: DISCONTINUED | OUTPATIENT
Start: 2018-08-07 | End: 2018-08-07

## 2018-08-07 RX ORDER — ACETAMINOPHEN 325 MG/1
650 TABLET ORAL EVERY 6 HOURS PRN
Status: DISCONTINUED | OUTPATIENT
Start: 2018-08-07 | End: 2018-08-08 | Stop reason: HOSPADM

## 2018-08-07 RX ORDER — OXYCODONE HYDROCHLORIDE 5 MG/1
5 TABLET ORAL
Status: DISCONTINUED | OUTPATIENT
Start: 2018-08-07 | End: 2018-08-08 | Stop reason: HOSPADM

## 2018-08-07 RX ORDER — NALOXONE HYDROCHLORIDE 0.4 MG/ML
.1-.4 INJECTION, SOLUTION INTRAMUSCULAR; INTRAVENOUS; SUBCUTANEOUS
Status: DISCONTINUED | OUTPATIENT
Start: 2018-08-07 | End: 2018-08-07

## 2018-08-07 RX ORDER — ACETAMINOPHEN 325 MG/1
650 TABLET ORAL EVERY 4 HOURS PRN
Qty: 100 TABLET | Refills: 0 | Status: SHIPPED | OUTPATIENT
Start: 2018-08-07

## 2018-08-07 RX ORDER — GLYCOPYRROLATE 0.2 MG/ML
INJECTION, SOLUTION INTRAMUSCULAR; INTRAVENOUS PRN
Status: DISCONTINUED | OUTPATIENT
Start: 2018-08-07 | End: 2018-08-07

## 2018-08-07 RX ORDER — ONDANSETRON 2 MG/ML
INJECTION INTRAMUSCULAR; INTRAVENOUS PRN
Status: DISCONTINUED | OUTPATIENT
Start: 2018-08-07 | End: 2018-08-07

## 2018-08-07 RX ORDER — ACETAMINOPHEN 325 MG/1
650 TABLET ORAL
Status: DISCONTINUED | OUTPATIENT
Start: 2018-08-07 | End: 2018-08-08 | Stop reason: HOSPADM

## 2018-08-07 RX ORDER — FENTANYL CITRATE 50 UG/ML
INJECTION, SOLUTION INTRAMUSCULAR; INTRAVENOUS PRN
Status: DISCONTINUED | OUTPATIENT
Start: 2018-08-07 | End: 2018-08-07

## 2018-08-07 RX ORDER — LIDOCAINE HYDROCHLORIDE 20 MG/ML
INJECTION, SOLUTION INFILTRATION; PERINEURAL PRN
Status: DISCONTINUED | OUTPATIENT
Start: 2018-08-07 | End: 2018-08-07

## 2018-08-07 RX ORDER — ONDANSETRON 2 MG/ML
4 INJECTION INTRAMUSCULAR; INTRAVENOUS EVERY 30 MIN PRN
Status: DISCONTINUED | OUTPATIENT
Start: 2018-08-07 | End: 2018-08-07

## 2018-08-07 RX ORDER — METOPROLOL TARTRATE 50 MG
50 TABLET ORAL 2 TIMES DAILY
Status: DISCONTINUED | OUTPATIENT
Start: 2018-08-07 | End: 2018-08-08 | Stop reason: HOSPADM

## 2018-08-07 RX ORDER — SODIUM CHLORIDE, SODIUM LACTATE, POTASSIUM CHLORIDE, CALCIUM CHLORIDE 600; 310; 30; 20 MG/100ML; MG/100ML; MG/100ML; MG/100ML
INJECTION, SOLUTION INTRAVENOUS CONTINUOUS
Status: DISCONTINUED | OUTPATIENT
Start: 2018-08-07 | End: 2018-08-07 | Stop reason: HOSPADM

## 2018-08-07 RX ORDER — DEXAMETHASONE SODIUM PHOSPHATE 4 MG/ML
INJECTION, SOLUTION INTRA-ARTICULAR; INTRALESIONAL; INTRAMUSCULAR; INTRAVENOUS; SOFT TISSUE PRN
Status: DISCONTINUED | OUTPATIENT
Start: 2018-08-07 | End: 2018-08-07

## 2018-08-07 RX ORDER — HYDRALAZINE HYDROCHLORIDE 20 MG/ML
2.5-5 INJECTION INTRAMUSCULAR; INTRAVENOUS EVERY 10 MIN PRN
Status: DISCONTINUED | OUTPATIENT
Start: 2018-08-07 | End: 2018-08-07

## 2018-08-07 RX ORDER — ALBUTEROL SULFATE 90 UG/1
2 AEROSOL, METERED RESPIRATORY (INHALATION) EVERY 4 HOURS PRN
Status: DISCONTINUED | OUTPATIENT
Start: 2018-08-07 | End: 2018-08-08 | Stop reason: HOSPADM

## 2018-08-07 RX ORDER — TIOTROPIUM BROMIDE 18 UG/1
18 CAPSULE ORAL; RESPIRATORY (INHALATION) DAILY
Status: DISCONTINUED | OUTPATIENT
Start: 2018-08-07 | End: 2018-08-08 | Stop reason: HOSPADM

## 2018-08-07 RX ORDER — FENTANYL CITRATE 50 UG/ML
25-50 INJECTION, SOLUTION INTRAMUSCULAR; INTRAVENOUS
Status: DISCONTINUED | OUTPATIENT
Start: 2018-08-07 | End: 2018-08-07

## 2018-08-07 RX ORDER — PROPOFOL 10 MG/ML
INJECTION, EMULSION INTRAVENOUS PRN
Status: DISCONTINUED | OUTPATIENT
Start: 2018-08-07 | End: 2018-08-07

## 2018-08-07 RX ORDER — LIDOCAINE 40 MG/G
CREAM TOPICAL
Status: DISCONTINUED | OUTPATIENT
Start: 2018-08-07 | End: 2018-08-08 | Stop reason: HOSPADM

## 2018-08-07 RX ORDER — LIDOCAINE HYDROCHLORIDE AND EPINEPHRINE 10; 10 MG/ML; UG/ML
INJECTION, SOLUTION INFILTRATION; PERINEURAL PRN
Status: DISCONTINUED | OUTPATIENT
Start: 2018-08-07 | End: 2018-08-07

## 2018-08-07 RX ORDER — OXYCODONE HYDROCHLORIDE 5 MG/1
5-10 TABLET ORAL EVERY 4 HOURS PRN
Qty: 40 TABLET | Refills: 0 | Status: SHIPPED | OUTPATIENT
Start: 2018-08-07

## 2018-08-07 RX ORDER — ONDANSETRON 4 MG/1
4 TABLET, ORALLY DISINTEGRATING ORAL EVERY 6 HOURS PRN
Status: DISCONTINUED | OUTPATIENT
Start: 2018-08-07 | End: 2018-08-08 | Stop reason: HOSPADM

## 2018-08-07 RX ORDER — LEVOTHYROXINE SODIUM 88 UG/1
88 TABLET ORAL EVERY MORNING
Status: DISCONTINUED | OUTPATIENT
Start: 2018-08-08 | End: 2018-08-08 | Stop reason: HOSPADM

## 2018-08-07 RX ORDER — LABETALOL HYDROCHLORIDE 5 MG/ML
10 INJECTION, SOLUTION INTRAVENOUS
Status: DISCONTINUED | OUTPATIENT
Start: 2018-08-07 | End: 2018-08-07

## 2018-08-07 RX ORDER — PROCHLORPERAZINE MALEATE 5 MG
5 TABLET ORAL EVERY 6 HOURS PRN
Status: DISCONTINUED | OUTPATIENT
Start: 2018-08-07 | End: 2018-08-08 | Stop reason: HOSPADM

## 2018-08-07 RX ORDER — ONDANSETRON 4 MG/1
4 TABLET, ORALLY DISINTEGRATING ORAL EVERY 30 MIN PRN
Status: DISCONTINUED | OUTPATIENT
Start: 2018-08-07 | End: 2018-08-07

## 2018-08-07 RX ORDER — ALBUMIN, HUMAN INJ 5% 5 %
SOLUTION INTRAVENOUS CONTINUOUS PRN
Status: DISCONTINUED | OUTPATIENT
Start: 2018-08-07 | End: 2018-08-07

## 2018-08-07 RX ORDER — ATORVASTATIN CALCIUM 80 MG/1
80 TABLET, FILM COATED ORAL AT BEDTIME
Status: DISCONTINUED | OUTPATIENT
Start: 2018-08-07 | End: 2018-08-08 | Stop reason: HOSPADM

## 2018-08-07 RX ORDER — NALOXONE HYDROCHLORIDE 0.4 MG/ML
.1-.4 INJECTION, SOLUTION INTRAMUSCULAR; INTRAVENOUS; SUBCUTANEOUS
Status: DISCONTINUED | OUTPATIENT
Start: 2018-08-07 | End: 2018-08-08 | Stop reason: HOSPADM

## 2018-08-07 RX ORDER — FUROSEMIDE 40 MG
40 TABLET ORAL EVERY MORNING
Status: DISCONTINUED | OUTPATIENT
Start: 2018-08-08 | End: 2018-08-08 | Stop reason: HOSPADM

## 2018-08-07 RX ORDER — ONDANSETRON 2 MG/ML
4 INJECTION INTRAMUSCULAR; INTRAVENOUS EVERY 6 HOURS PRN
Status: DISCONTINUED | OUTPATIENT
Start: 2018-08-07 | End: 2018-08-08 | Stop reason: HOSPADM

## 2018-08-07 RX ORDER — AMLODIPINE BESYLATE 5 MG/1
5 TABLET ORAL EVERY MORNING
Status: DISCONTINUED | OUTPATIENT
Start: 2018-08-08 | End: 2018-08-08 | Stop reason: HOSPADM

## 2018-08-07 RX ADMIN — GLYCOPYRROLATE 0.2 MG: 0.2 INJECTION, SOLUTION INTRAMUSCULAR; INTRAVENOUS at 13:20

## 2018-08-07 RX ADMIN — PHENYLEPHRINE HYDROCHLORIDE 150 MCG: 10 INJECTION, SOLUTION INTRAMUSCULAR; INTRAVENOUS; SUBCUTANEOUS at 15:03

## 2018-08-07 RX ADMIN — PROPOFOL 30 MG: 10 INJECTION, EMULSION INTRAVENOUS at 13:34

## 2018-08-07 RX ADMIN — PHENYLEPHRINE HYDROCHLORIDE 150 MCG: 10 INJECTION, SOLUTION INTRAMUSCULAR; INTRAVENOUS; SUBCUTANEOUS at 13:56

## 2018-08-07 RX ADMIN — SODIUM CHLORIDE, POTASSIUM CHLORIDE, SODIUM LACTATE AND CALCIUM CHLORIDE: 600; 310; 30; 20 INJECTION, SOLUTION INTRAVENOUS at 13:08

## 2018-08-07 RX ADMIN — ALBUMIN HUMAN: 0.05 INJECTION, SOLUTION INTRAVENOUS at 16:38

## 2018-08-07 RX ADMIN — PHENYLEPHRINE HYDROCHLORIDE 150 MCG: 10 INJECTION, SOLUTION INTRAMUSCULAR; INTRAVENOUS; SUBCUTANEOUS at 14:03

## 2018-08-07 RX ADMIN — LIDOCAINE HYDROCHLORIDE,EPINEPHRINE BITARTRATE 3 ML: 10; .01 INJECTION, SOLUTION INFILTRATION; PERINEURAL at 13:38

## 2018-08-07 RX ADMIN — PHENYLEPHRINE HYDROCHLORIDE 100 MCG: 10 INJECTION, SOLUTION INTRAMUSCULAR; INTRAVENOUS; SUBCUTANEOUS at 15:30

## 2018-08-07 RX ADMIN — PROPOFOL 120 MG: 10 INJECTION, EMULSION INTRAVENOUS at 13:21

## 2018-08-07 RX ADMIN — FENTANYL CITRATE 25 MCG: 50 INJECTION, SOLUTION INTRAMUSCULAR; INTRAVENOUS at 15:14

## 2018-08-07 RX ADMIN — PHENYLEPHRINE HYDROCHLORIDE 100 MCG: 10 INJECTION, SOLUTION INTRAMUSCULAR; INTRAVENOUS; SUBCUTANEOUS at 13:20

## 2018-08-07 RX ADMIN — CEFAZOLIN SODIUM 2 G: 2 INJECTION, SOLUTION INTRAVENOUS at 13:45

## 2018-08-07 RX ADMIN — DEXAMETHASONE SODIUM PHOSPHATE 10 MG: 4 INJECTION, SOLUTION INTRA-ARTICULAR; INTRALESIONAL; INTRAMUSCULAR; INTRAVENOUS; SOFT TISSUE at 13:36

## 2018-08-07 RX ADMIN — REMIFENTANIL HYDROCHLORIDE 0.05 MCG/KG/MIN: 1 INJECTION, POWDER, LYOPHILIZED, FOR SOLUTION INTRAVENOUS at 13:31

## 2018-08-07 RX ADMIN — ONDANSETRON 4 MG: 2 INJECTION INTRAMUSCULAR; INTRAVENOUS at 16:52

## 2018-08-07 RX ADMIN — Medication 100 MG: at 13:22

## 2018-08-07 RX ADMIN — BENZOCAINE AND MENTHOL 1 LOZENGE: 15; 3.6 LOZENGE ORAL at 18:19

## 2018-08-07 RX ADMIN — PHENYLEPHRINE HYDROCHLORIDE 100 MCG: 10 INJECTION, SOLUTION INTRAMUSCULAR; INTRAVENOUS; SUBCUTANEOUS at 14:27

## 2018-08-07 RX ADMIN — METOPROLOL TARTRATE 50 MG: 50 TABLET, FILM COATED ORAL at 20:41

## 2018-08-07 RX ADMIN — PHENYLEPHRINE HYDROCHLORIDE 150 MCG: 10 INJECTION, SOLUTION INTRAMUSCULAR; INTRAVENOUS; SUBCUTANEOUS at 13:39

## 2018-08-07 RX ADMIN — ALBUMIN HUMAN: 0.05 INJECTION, SOLUTION INTRAVENOUS at 15:47

## 2018-08-07 RX ADMIN — PHENYLEPHRINE HYDROCHLORIDE 100 MCG: 10 INJECTION, SOLUTION INTRAMUSCULAR; INTRAVENOUS; SUBCUTANEOUS at 13:35

## 2018-08-07 RX ADMIN — FENTANYL CITRATE 50 MCG: 50 INJECTION, SOLUTION INTRAMUSCULAR; INTRAVENOUS at 13:08

## 2018-08-07 RX ADMIN — PHENYLEPHRINE HYDROCHLORIDE 100 MCG: 10 INJECTION, SOLUTION INTRAMUSCULAR; INTRAVENOUS; SUBCUTANEOUS at 15:39

## 2018-08-07 RX ADMIN — CEFAZOLIN SODIUM 1 G: 2 INJECTION, SOLUTION INTRAVENOUS at 15:34

## 2018-08-07 RX ADMIN — SODIUM CHLORIDE, POTASSIUM CHLORIDE, SODIUM LACTATE AND CALCIUM CHLORIDE: 600; 310; 30; 20 INJECTION, SOLUTION INTRAVENOUS at 14:10

## 2018-08-07 RX ADMIN — PHENYLEPHRINE HYDROCHLORIDE 100 MCG: 10 INJECTION, SOLUTION INTRAMUSCULAR; INTRAVENOUS; SUBCUTANEOUS at 16:23

## 2018-08-07 RX ADMIN — GLYCOPYRROLATE 0.2 MG: 0.2 INJECTION, SOLUTION INTRAMUSCULAR; INTRAVENOUS at 14:37

## 2018-08-07 RX ADMIN — PHENYLEPHRINE HYDROCHLORIDE 100 MCG: 10 INJECTION, SOLUTION INTRAMUSCULAR; INTRAVENOUS; SUBCUTANEOUS at 14:34

## 2018-08-07 RX ADMIN — ATORVASTATIN CALCIUM 80 MG: 80 TABLET, FILM COATED ORAL at 21:47

## 2018-08-07 RX ADMIN — FENTANYL CITRATE 50 MCG: 50 INJECTION INTRAMUSCULAR; INTRAVENOUS at 17:46

## 2018-08-07 RX ADMIN — PHENYLEPHRINE HYDROCHLORIDE 150 MCG: 10 INJECTION, SOLUTION INTRAMUSCULAR; INTRAVENOUS; SUBCUTANEOUS at 14:51

## 2018-08-07 RX ADMIN — HYDROMORPHONE HYDROCHLORIDE 0.2 MG: 10 INJECTION, SOLUTION INTRAMUSCULAR; INTRAVENOUS; SUBCUTANEOUS at 18:52

## 2018-08-07 RX ADMIN — LIDOCAINE HYDROCHLORIDE 100 MG: 20 INJECTION, SOLUTION INFILTRATION; PERINEURAL at 13:20

## 2018-08-07 RX ADMIN — POLYPROPYLENE GLYCOL 400, PROPYLENE GLYCOL 1 DROP: .4; .3 LIQUID OPHTHALMIC at 13:23

## 2018-08-07 RX ADMIN — PHENYLEPHRINE HYDROCHLORIDE 100 MCG: 10 INJECTION, SOLUTION INTRAMUSCULAR; INTRAVENOUS; SUBCUTANEOUS at 14:20

## 2018-08-07 RX ADMIN — PHENYLEPHRINE HYDROCHLORIDE 100 MCG: 10 INJECTION, SOLUTION INTRAMUSCULAR; INTRAVENOUS; SUBCUTANEOUS at 17:04

## 2018-08-07 RX ADMIN — FENTANYL CITRATE 25 MCG: 50 INJECTION, SOLUTION INTRAMUSCULAR; INTRAVENOUS at 17:00

## 2018-08-07 RX ADMIN — PHENYLEPHRINE HYDROCHLORIDE 100 MCG: 10 INJECTION, SOLUTION INTRAMUSCULAR; INTRAVENOUS; SUBCUTANEOUS at 16:12

## 2018-08-07 RX ADMIN — OXYCODONE HYDROCHLORIDE 5 MG: 5 TABLET ORAL at 20:41

## 2018-08-07 RX ADMIN — FENTANYL CITRATE 25 MCG: 50 INJECTION, SOLUTION INTRAMUSCULAR; INTRAVENOUS at 13:54

## 2018-08-07 RX ADMIN — PHENYLEPHRINE HYDROCHLORIDE 200 MCG: 10 INJECTION, SOLUTION INTRAMUSCULAR; INTRAVENOUS; SUBCUTANEOUS at 15:15

## 2018-08-07 NOTE — IP AVS SNAPSHOT
MRN:[id number]                      After Visit Summary   8/7/2018    Hanna Mcmahon    MRN: [id number]           Thank you!     Thank you for choosing [facility] for your care. Our goal is always to provide you with excellent care. Hearing back from our patients is one way we can continue to improve our services. Please take a few minutes to complete the written survey that you may receive in the mail after you visit with us. Thank you!        Patient Information     Date Of Birth          1940        About your hospital stay     You were admitted on:  August 7, 2018 You last received care in the:  Unit 6D Observation [facility]    You were discharged on:  August 8, 2018        Reason for your hospital stay       Post-operative care                  Who to Call     For medical emergencies, please call 911.  For non-urgent questions about your medical care, please call your primary care provider or clinic, [phone]  For questions related to your surgery, please call your surgery clinic        Attending Provider     Provider Sophie Velasco MD Otolaryngology       Primary Care Provider Office Phone # Fax #    Jw Jensen [phone]      After Care Instructions     Diet Instructions       Resume pre procedure diet            Discharge Instructions       Keep neck wound clean and dry. Do not get wet while drain is in place. May shower from shoulders down and have someone help clean hair. Apply Aquaphor ointment to incision three times daily. Once drain is out, 24 hours after that you may shower completely. No scrubbing the incision.            Discharge Instructions - Lifting restrictions       Lifting Restrictions 10 pounds until seen at Post-op follow up appointment            Monitor and record       You are going home with surgical drains in place. Empty the drains and record output 3 times per day. Squeeze the bulb of the drain and replace cap.  If  "you have multiple drains, record the outputs separately. Once output is less than 30 mL in 24 hours, please contact the ENT clinic to schedule an appointment for drain removal.            No driving or operating machinery       until the day after procedure or while taking narcotic pain medication            Notify Physician        Please notify your doctor if you experience wound breakdown, sustained bleeding from the wound site, or increasing redness, swelling, and/or purulent malorodorous discharge from the wound site which may indicate infection. If you feel it is acute, please return to the emergency department. If you have questions or concerns during the day please call ENT clinic and [phone]. If at night you can call [facility] at [phone] and ask for the \"ENT resident on call\".                  Follow-up Appointments     Adult [facility] Follow-up and recommended labs and tests       Follow up in ENT clinic with Dr. Herrera on Friday, 8/17 at 3PM. Please call the clinic with questions/concerns: [phone].    Otolaryngology/ENT Clinic:  [facility]  [address] [place] [id number]    Appointments on [facility] and/or [facility] (with [facility] or [facility] provider or service). Call [phone] if you haven't heard regarding these appointments within 7 days of discharge.                  Your next 10 appointments already scheduled     Aug 17, 2018  3:00 PM CDT   (Arrive by 2:45 PM)   Return Visit with Sophie Herrera MD   [facility] ([facility])    [address] [place] [id number] [phone]              Further instructions from your care team       [facility]  Same-Day Surgery   Adult Discharge Orders & Instructions     For 24 hours after surgery    1. Get plenty of rest.  A responsible adult must stay with you for at " least 24 hours after you leave the hospital.   2. Do not drive or use heavy equipment.  If you have weakness or tingling, don't drive or use heavy equipment until this feeling goes away.  3. Do not drink alcohol.  4. Avoid strenuous or risky activities.  Ask for help when climbing stairs.   5. You may feel lightheaded.  IF so, sit for a few minutes before standing.  Have someone help you get up.   6. If you have nausea (feel sick to your stomach): Drink only clear liquids such as apple juice, ginger ale, broth or 7-Up.  Rest may also help.  Be sure to drink enough fluids.  Move to a regular diet as you feel able.  7. You may have a slight fever. Call the doctor if your fever is over 100 F (37.7 C) (taken under the tongue) or lasts longer than 24 hours.  8. You may have a dry mouth, a sore throat, muscle aches or trouble sleeping.  These should go away after 24 hours.  9. Do not make important or legal decisions.   Call your doctor for any of the followin.  Signs of infection (fever, growing tenderness at the surgery site, a large amount of drainage or bleeding, severe pain, foul-smelling drainage, redness, swelling).    2. It has been over 8 to 10 hours since surgery and you are still not able to urinate (pass water).    3.  Headache for over 24 hours.    4.  Numbness, tingling or weakness the day after surgery (if you had spinal anesthesia).  To contact a doctor, call ___Dr. Herrera @ [phone]______ or:    X   [phone] and ask for the resident on call for   ______ENT_______ (answered 24 hours a day)  X   Emergency Department:    [facility]: [phone]           Tips for taking pain medications  To get the best pain relief possible , remember these points:      Take pain medications as directed, before pain becomes severe      Pain medication can upset your stomach: taking it with food may help      Constipation is a common side effect of pain medication. Drink plenty of  Fluids      Eat foods  high in fiber. Take a stool softener  if recommended by your doctor or  Pharmacist.        Do not drink alcohol, drive or operate machinery while taking pain medications.      Ask about other ways to control pain, such as with heat, ice or relaxation.      Discharge Instructions for Thyroidectomy  You had surgery called thyroidectomy. This means that part or all of your thyroid gland was removed. The main job of the thyroid gland is to make thyroid hormone. This hormone controls your body s metabolism. This is the way your body creates and uses energy. Removing the thyroid gland removes your body s source of thyroid hormone. So after the surgery, you will need to take thyroid hormone pills every day. This helps keep the level of thyroid hormone in your body steady.  Recovering after surgery    Get plenty of rest.    Care for your incision as directed.    Avoid heavy lifting and strenuous activity for 3 to 5 weeks.    Walk a few times daily. But don t push yourself too hard. Slowly increase your pace and distance, as you feel able.    Return to work when your doctor says it s OK.    Keep a card in your wallet that lists:    Your name and contact information    Your doctor s name and contact information    The name of your disease    The brand name and dose of your medicine  Taking your thyroid medicine    Take your medicine as directed.    Use a pillbox labeled with the days of the week. This will help you remember whether you ve taken your medicine each day.    Take your medicine with a full glass of water and preferably no other food or medicine for at least 1 hour after or 4 hours before taking the pill. The pill needs to reach your stomach and not dissolve in your throat.    Try to take your medicine with the same types and amounts of food and liquid each day. This helps control the amount of thyroid hormone in your system.    After taking your medicine:    Wait 4 hours before eating or drinking anything that  contains soy.    Wait 4 hours before taking certain medicine. These include:    Iron supplements    Calcium supplements    Antacids that contain either calcium or aluminum hydroxide    Medicines that lower your cholesterol    Do not stop taking your medicine even if you become pregnant. Your dose may have to be increased during pregnancy.    Never stop taking medicines on your own.  Keeping your doctor s appointments    See your doctor for regular visits. These are needed to monitor your health.    Have routine blood tests done. These check the level of thyroid hormone in your body. This helps your doctor know whether to adjust the dosage of your medicine if needed. These tests are generally done no more than once every 6 weeks. Later on, you may only need blood tests once a year.    Early after surgery, your calcium level will also need to be checked, particularly if all or most of your thyroid was removed.    Tell your doctor about any signs of further thyroid problems.    Signs that you may have too much thyroid hormone in your body include:    Restlessness    Rapid weight loss    Sweating    Palpitations    Signs that you may have too little thyroid hormone in your body include:    Fatigue or sluggishness    Puffy hands, face, or feet    Dry, course skin    Hoarseness    Muscle pain    Slow pulse (less than 60 beats per minute)  When to seek medical care  Call your doctor right away if you have any of the following:    Fever above 100.4 F (38 C)    Swelling or bleeding at the incision site    Choking    Trouble breathing    A sore throat that lasts longer than 7 days    Tingling or cramps in your hands, feet, or lips   Date Last Reviewed: 12/1/2016 2000-2017 [employer]. [address] [place] [address], [place] [address]. All rights reserved. This information is not intended as a substitute for professional medical care. Always follow your healthcare professional's instructions.                  "    Pending Results     Date and Time Order Name Status Description    8/7/2018 1614 Surgical pathology exam In process     8/7/2018 1156 EKG 12-lead, tracing only Preliminary             Statement of Approval     Ordered          08/08/18 0847  I have reviewed and agree with all the recommendations and orders detailed in this document.  EFFECTIVE NOW     Approved and electronically signed by:  Tamela Shaver PA-C             Admission Information     Date & Time Provider Department Dept. Phone    8/7/2018 Sophie Herrera MD Unit 6D Observation [facility] [phone]      Your Vitals Were     Blood Pressure Temperature Respirations Height Weight Pulse Oximetry    120/69 (BP Location: Right arm) 97.4  F (36.3  C) (Oral) 20 1.549 m (5' 1\") 73.9 kg (162 lb 14.7 oz) 97%    BMI (Body Mass Index)                   30.78 kg/m2           MyChart Information     [employer] gives you secure access to your electronic health record. If you see a primary care provider, you can also send messages to your care team and make appointments. If you have questions, please call your primary care clinic.  If you do not have a primary care provider, please call [phone] and they will assist you.        Care EveryWhere ID     This is your Care EveryWhere ID. This could be used by other organizations to access your [facility] medical records  [id number]        Equal Access to Services     FAHAD CISNEROS : Hadii noni webbero Sooneil, waaxda luqadaha, qaybta kaalmada fausto, rodriguez charles. So [facility] [phone].    ATENCIÓN: Si habla español, tiene a webster disposición servicios gratuitos de asistencia lingüística. Llame al [phone].    We comply with applicable federal civil rights laws and [place] laws. We do not discriminate on the basis of race, color, national origin, age, disability, sex, sexual orientation, or gender identity.               Review of your medicines      START taking     "    Dose / Directions    acetaminophen 325 MG tablet   Commonly known as:  TYLENOL   Used for:  Postoperative pain        Dose:  650 mg   Take 2 tablets (650 mg) by mouth every 4 hours as needed for other (mild pain)   Quantity:  100 tablet   Refills:  0       oxyCODONE IR 5 MG tablet   Commonly known as:  ROXICODONE   Used for:  Postoperative pain        Dose:  5-10 mg   Take 1-2 tablets (5-10 mg) by mouth every 4 hours as needed for pain or other (Moderate to Severe)   Quantity:  40 tablet   Refills:  0         CONTINUE these medicines which have NOT CHANGED        Dose / Directions    amLODIPine 5 MG tablet   Commonly known as:  NORVASC        Dose:  5 mg   Take 5 mg by mouth every morning   Refills:  0       atorvastatin 80 MG tablet   Commonly known as:  LIPITOR        Dose:  80 mg   Take 80 mg by mouth At Bedtime   Refills:  0       furosemide 40 MG tablet   Commonly known as:  LASIX        Dose:  40 mg   Take 40 mg by mouth every morning   Refills:  0       levothyroxine 88 MCG tablet   Commonly known as:  SYNTHROID/LEVOTHROID        Dose:  88 mcg   Take 88 mcg by mouth every morning   Refills:  0       metoprolol tartrate 50 MG tablet   Commonly known as:  LOPRESSOR        Dose:  50 mg   Take 50 mg by mouth 2 times daily   Refills:  0       mometasone-formoterol 100-5 MCG/ACT oral inhaler   Commonly known as:  DULERA        Dose:  2 puff   Inhale 2 puffs into the lungs as needed   Refills:  0       potassium chloride SA 10 MEQ CR tablet   Commonly known as:  K-DUR/KLOR-CON M        Dose:  10 mEq   Take 10 mEq by mouth every morning   Refills:  0       PROAIR  (90 Base) MCG/ACT Inhaler   Generic drug:  albuterol        Inhale into the lungs as needed   Refills:  0       SM CALCIUM 500/VITAMIN D3 500-400 MG-UNIT Tabs per tablet   Generic drug:  calcium carbonate-vitamin D        Dose:  1 tablet   Take 1 tablet by mouth every morning   Refills:  0       tiotropium 18 MCG capsule   Commonly known as:   SPIRIVA        Dose:  18 mcg   Inhale 18 mcg into the lungs as needed   Refills:  0       VITAMIN D (CHOLECALCIFEROL) PO        Take by mouth every morning   Refills:  0         STOP taking     study - aspirin vs placebo 1 tablet tablet   Commonly known as:  IDS #[id number]                Where to get your medicines      These medications were sent to [facility] - [place], [place] - [address]  [address] [address]     Phone:  [phone]     acetaminophen 325 MG tablet         Some of these will need a paper prescription and others can be bought over the counter. Ask your nurse if you have questions.     Bring a paper prescription for each of these medications     oxyCODONE IR 5 MG tablet                Protect others around you: Learn how to safely use, store and throw away your medicines at www.disposemymeds.org.        Information about OPIOIDS     PRESCRIPTION OPIOIDS: WHAT YOU NEED TO KNOW   We gave you an opioid (narcotic) pain medicine. It is important to manage your pain, but opioids are not always the best choice. You should first try all the other options your care team gave you. Take this medicine for as short a time (and as few doses) as possible.    Some activities can increase your pain, such as bandage changes or therapy sessions. It may help to take your pain medicine 30 to 60 minutes before these activities. Reduce your stress by getting enough sleep, working on hobbies you enjoy and practicing relaxation or meditation. Talk to your care team about ways to manage your pain beyond prescription opioids.    These medicines have risks:    DO NOT drive when on new or higher doses of pain medicine. These medicines can affect your alertness and reaction times, and you could be arrested for driving under the influence (DUI). If you need to use opioids long-term, talk to your care team about driving.    DO NOT operate heavy machinery    DO NOT do any other dangerous  activities while taking these medicines.    DO NOT drink any alcohol while taking these medicines.     If the opioid prescribed includes acetaminophen, DO NOT take with any other medicines that contain acetaminophen. Read all labels carefully. Look for the word  acetaminophen  or  Tylenol.  Ask your pharmacist if you have questions or are unsure.    You can get addicted to pain medicines, especially if you have a history of addiction (chemical, alcohol or substance dependence). Talk to your care team about ways to reduce this risk.    All opioids tend to cause constipation. Drink plenty of water and eat foods that have a lot of fiber, such as fruits, vegetables, prune juice, apple juice and high-fiber cereal. Take a laxative (Miralax, milk of magnesia, Colace, Senna) if you don t move your bowels at least every other day. Other side effects include upset stomach, sleepiness, dizziness, throwing up, tolerance (needing more of the medicine to have the same effect), physical dependence and slowed breathing.    Store your pills in a secure place, locked if possible. We will not replace any lost or stolen medicine. If you don t finish your medicine, please throw away (dispose) as directed by your pharmacist. The [place] Pollution [employer] Agency has more information about safe disposal: https://www.pca.[facility].[place].us/living-green/managing-unwanted-medications             Medication List: This is a list of all your medications and when to take them. Check marks below indicate your daily home schedule. Keep this list as a reference.      Medications           Morning Afternoon Evening Bedtime As Needed    acetaminophen 325 MG tablet   Commonly known as:  TYLENOL   Take 2 tablets (650 mg) by mouth every 4 hours as needed for other (mild pain)   Last time this was given:  650 mg on 8/8/2018 12:07 AM                                amLODIPine 5 MG tablet   Commonly known as:  NORVASC   Take 5 mg by mouth every morning   Last  time this was given:  5 mg on 8/8/2018  7:50 AM                                atorvastatin 80 MG tablet   Commonly known as:  LIPITOR   Take 80 mg by mouth At Bedtime   Last time this was given:  80 mg on 8/7/2018  9:47 PM                                furosemide 40 MG tablet   Commonly known as:  LASIX   Take 40 mg by mouth every morning   Last time this was given:  40 mg on 8/8/2018  7:50 AM                                levothyroxine 88 MCG tablet   Commonly known as:  SYNTHROID/LEVOTHROID   Take 88 mcg by mouth every morning   Last time this was given:  88 mcg on 8/8/2018  7:50 AM                                metoprolol tartrate 50 MG tablet   Commonly known as:  LOPRESSOR   Take 50 mg by mouth 2 times daily   Last time this was given:  50 mg on 8/8/2018  7:50 AM                                mometasone-formoterol 100-5 MCG/ACT oral inhaler   Commonly known as:  DULERA   Inhale 2 puffs into the lungs as needed                                oxyCODONE IR 5 MG tablet   Commonly known as:  ROXICODONE   Take 1-2 tablets (5-10 mg) by mouth every 4 hours as needed for pain or other (Moderate to Severe)   Last time this was given:  5 mg on 8/7/2018  8:41 PM                                potassium chloride SA 10 MEQ CR tablet   Commonly known as:  K-DUR/KLOR-CON M   Take 10 mEq by mouth every morning                                PROAIR  (90 Base) MCG/ACT Inhaler   Inhale into the lungs as needed   Generic drug:  albuterol                                SM CALCIUM 500/VITAMIN D3 500-400 MG-UNIT Tabs per tablet   Take 1 tablet by mouth every morning   Generic drug:  calcium carbonate-vitamin D                                tiotropium 18 MCG capsule   Commonly known as:  SPIRIVA   Inhale 18 mcg into the lungs as needed                                VITAMIN D (CHOLECALCIFEROL) PO   Take by mouth every morning

## 2018-08-07 NOTE — ANESTHESIA CARE TRANSFER NOTE
Patient: Hanna Mcmahon    Procedure(s):  Left Hemithyroidectomy - Wound Class: I-Clean    Diagnosis: Hurthle Cell Carcinoma   Diagnosis Additional Information: No value filed.    Anesthesia Type:   General, ETT     Note:  Airway :Face Mask  Patient transferred to:PACU  Handoff Report: Identifed the Patient, Identified the Reponsible Provider, Reviewed the pertinent medical history, Discussed the surgical course, Reviewed Intra-OP anesthesia mangement and issues during anesthesia, Set expectations for post-procedure period and Allowed opportunity for questions and acknowledgement of understanding      Vitals: (Last set prior to Anesthesia Care Transfer)    CRNA VITALS  8/7/2018 1650 - 8/7/2018 1726      8/7/2018             Resp Rate (observed): (!)  2    Resp Rate (set): 10                Electronically Signed By: SAVANAH Wilson CRNA  August 7, 2018  5:26 PM

## 2018-08-07 NOTE — OR NURSING
Patient complains of pain with swallowing on left side. Difficult to get liquid down.Giving patient ice chips and medicating for pain

## 2018-08-07 NOTE — ANESTHESIA POSTPROCEDURE EVALUATION
Patient: Hanna Mcmahon    Procedure(s):  Left Hemithyroidectomy - Wound Class: I-Clean    Diagnosis:Hurthle Cell Carcinoma   Diagnosis Additional Information: No value filed.    Anesthesia Type:  General, ETT    Note:  Anesthesia Post Evaluation    Patient location during evaluation: PACU  Patient participation: Able to fully participate in evaluation  Level of consciousness: awake and alert  Pain management: adequate  Airway patency: patent  Cardiovascular status: acceptable  Respiratory status: acceptable  Hydration status: acceptable  PONV: none     Anesthetic complications: None          Last vitals:  Vitals:    08/07/18 1815 08/07/18 1830 08/07/18 1845   BP: 134/63  143/67   Resp: 16 16 16   Temp: 36.7  C (98.1  F) 36.7  C (98.1  F) 36.6  C (97.9  F)   SpO2: 98% 97% 93%         Electronically Signed By: Qian Sims MD  August 7, 2018  6:57 PM

## 2018-08-07 NOTE — IP AVS SNAPSHOT
Unit 6D Observation [address] [address]    Phone:  [phone]    Fax:  [phone]                                       After Visit Summary   8/7/2018    Hanna Mcmahon    MRN: [id number]           After Visit Summary Signature Page     I have received my discharge instructions, and my questions have been answered. I have discussed any challenges I see with this plan with the nurse or doctor.    ..........................................................................................................................................  Patient/Patient Representative Signature      ..........................................................................................................................................  Patient Representative Print Name and Relationship to Patient    ..................................................               ................................................  Date                                            Time    ..........................................................................................................................................  Reviewed by Signature/Title    ...................................................              ..............................................  Date                                                            Time

## 2018-08-07 NOTE — DISCHARGE INSTRUCTIONS
[facility]  Same-Day Surgery   Adult Discharge Orders & Instructions     For 24 hours after surgery    1. Get plenty of rest.  A responsible adult must stay with you for at least 24 hours after you leave the hospital.   2. Do not drive or use heavy equipment.  If you have weakness or tingling, don't drive or use heavy equipment until this feeling goes away.  3. Do not drink alcohol.  4. Avoid strenuous or risky activities.  Ask for help when climbing stairs.   5. You may feel lightheaded.  IF so, sit for a few minutes before standing.  Have someone help you get up.   6. If you have nausea (feel sick to your stomach): Drink only clear liquids such as apple juice, ginger ale, broth or 7-Up.  Rest may also help.  Be sure to drink enough fluids.  Move to a regular diet as you feel able.  7. You may have a slight fever. Call the doctor if your fever is over 100 F (37.7 C) (taken under the tongue) or lasts longer than 24 hours.  8. You may have a dry mouth, a sore throat, muscle aches or trouble sleeping.  These should go away after 24 hours.  9. Do not make important or legal decisions.   Call your doctor for any of the followin.  Signs of infection (fever, growing tenderness at the surgery site, a large amount of drainage or bleeding, severe pain, foul-smelling drainage, redness, swelling).    2. It has been over 8 to 10 hours since surgery and you are still not able to urinate (pass water).    3.  Headache for over 24 hours.    4.  Numbness, tingling or weakness the day after surgery (if you had spinal anesthesia).  To contact a doctor, call ___Dr. Herrera @ [phone]______ or:    X   [phone] and ask for the resident on call for   ______ENT_______ (answered 24 hours a day)  X   Emergency Department:    [facility]: [phone]           Tips for taking pain medications  To get the best pain relief possible , remember these points:      Take pain medications as  directed, before pain becomes severe      Pain medication can upset your stomach: taking it with food may help      Constipation is a common side effect of pain medication. Drink plenty of  Fluids      Eat foods high in fiber. Take a stool softener  if recommended by your doctor or  Pharmacist.        Do not drink alcohol, drive or operate machinery while taking pain medications.      Ask about other ways to control pain, such as with heat, ice or relaxation.      Discharge Instructions for Thyroidectomy  You had surgery called thyroidectomy. This means that part or all of your thyroid gland was removed. The main job of the thyroid gland is to make thyroid hormone. This hormone controls your body s metabolism. This is the way your body creates and uses energy. Removing the thyroid gland removes your body s source of thyroid hormone. So after the surgery, you will need to take thyroid hormone pills every day. This helps keep the level of thyroid hormone in your body steady.  Recovering after surgery    Get plenty of rest.    Care for your incision as directed.    Avoid heavy lifting and strenuous activity for 3 to 5 weeks.    Walk a few times daily. But don t push yourself too hard. Slowly increase your pace and distance, as you feel able.    Return to work when your doctor says it s OK.    Keep a card in your wallet that lists:    Your name and contact information    Your doctor s name and contact information    The name of your disease    The brand name and dose of your medicine  Taking your thyroid medicine    Take your medicine as directed.    Use a pillbox labeled with the days of the week. This will help you remember whether you ve taken your medicine each day.    Take your medicine with a full glass of water and preferably no other food or medicine for at least 1 hour after or 4 hours before taking the pill. The pill needs to reach your stomach and not dissolve in your throat.    Try to take your medicine with  the same types and amounts of food and liquid each day. This helps control the amount of thyroid hormone in your system.    After taking your medicine:    Wait 4 hours before eating or drinking anything that contains soy.    Wait 4 hours before taking certain medicine. These include:    Iron supplements    Calcium supplements    Antacids that contain either calcium or aluminum hydroxide    Medicines that lower your cholesterol    Do not stop taking your medicine even if you become pregnant. Your dose may have to be increased during pregnancy.    Never stop taking medicines on your own.  Keeping your doctor s appointments    See your doctor for regular visits. These are needed to monitor your health.    Have routine blood tests done. These check the level of thyroid hormone in your body. This helps your doctor know whether to adjust the dosage of your medicine if needed. These tests are generally done no more than once every 6 weeks. Later on, you may only need blood tests once a year.    Early after surgery, your calcium level will also need to be checked, particularly if all or most of your thyroid was removed.    Tell your doctor about any signs of further thyroid problems.    Signs that you may have too much thyroid hormone in your body include:    Restlessness    Rapid weight loss    Sweating    Palpitations    Signs that you may have too little thyroid hormone in your body include:    Fatigue or sluggishness    Puffy hands, face, or feet    Dry, course skin    Hoarseness    Muscle pain    Slow pulse (less than 60 beats per minute)  When to seek medical care  Call your doctor right away if you have any of the following:    Fever above 100.4 F (38 C)    Swelling or bleeding at the incision site    Choking    Trouble breathing    A sore throat that lasts longer than 7 days    Tingling or cramps in your hands, feet, or lips   Date Last Reviewed: 12/1/2016 2000-2017 [employer]. [address], [place] [address]. All rights reserved. This information is not intended as a substitute for professional medical care. Always follow your healthcare professional's instructions.

## 2018-08-07 NOTE — ANESTHESIA CARE TRANSFER NOTE
Patient: Hanna Mcmahon    Procedure(s):  Left Hemithyroidectomy - Wound Class: I-Clean    Diagnosis: Hurthle Cell Carcinoma   Diagnosis Additional Information: No value filed.    Anesthesia Type:   No value filed.     Note:    Patient transferred to:PACU  Comments: Pt remains stable, monitors on alarms in place, report to PACU RN, no complicationsHandoff Report: Identifed the Patient, Identified the Reponsible Provider, Reviewed the pertinent medical history, Discussed the surgical course, Reviewed Intra-OP anesthesia mangement and issues during anesthesia, Set expectations for post-procedure period and Allowed opportunity for questions and acknowledgement of understanding      Vitals: (Last set prior to Anesthesia Care Transfer)              Electronically Signed By: SAVANAH Licea CRNA  August 7, 2018  12:32 PM

## 2018-08-07 NOTE — BRIEF OP NOTE
[facility], [place]    Brief Operative Note    Pre-operative diagnosis: Hurthle Cell Carcinoma   Post-operative diagnosis * No post-op diagnosis entered *  Procedure: Procedure(s):  Left Hemithyroidectomy - Wound Class: I-Clean  Surgeon: Surgeon(s) and Role:     * Sophie Herrera MD - Primary     * Emilie Carreon MD - Resident - Assisting     * Aracelis Hong MD - Resident - Assisting  Anesthesia: General   Estimated blood loss: 50 mL  Drains: Gaston-Erazo  Specimens:   ID Type Source Tests Collected by Time Destination   A : left hemithyroid Organ Thyroid, left SURGICAL PATHOLOGY EXAM Sophie Herrera MD 8/7/2018  4:13 PM      Findings:   Nerve stimulated at .45 mA at the end of the case  Complications: None.  Implants: None.

## 2018-08-08 VITALS
WEIGHT: 162.92 LBS | RESPIRATION RATE: 20 BRPM | BODY MASS INDEX: 30.76 KG/M2 | OXYGEN SATURATION: 97 % | DIASTOLIC BLOOD PRESSURE: 69 MMHG | HEIGHT: 61 IN | TEMPERATURE: 97.4 F | SYSTOLIC BLOOD PRESSURE: 120 MMHG

## 2018-08-08 LAB — INTERPRETATION ECG - MUSE: NORMAL

## 2018-08-08 PROCEDURE — 25000132 ZZH RX MED GY IP 250 OP 250 PS 637: Performed by: OTOLARYNGOLOGY

## 2018-08-08 PROCEDURE — G0378 HOSPITAL OBSERVATION PER HR: HCPCS

## 2018-08-08 RX ADMIN — LEVOTHYROXINE SODIUM 88 MCG: 88 TABLET ORAL at 07:50

## 2018-08-08 RX ADMIN — ACETAMINOPHEN 650 MG: 325 TABLET, FILM COATED ORAL at 00:07

## 2018-08-08 RX ADMIN — AMLODIPINE BESYLATE 5 MG: 5 TABLET ORAL at 07:50

## 2018-08-08 RX ADMIN — METOPROLOL TARTRATE 50 MG: 50 TABLET, FILM COATED ORAL at 07:50

## 2018-08-08 RX ADMIN — FUROSEMIDE 40 MG: 40 TABLET ORAL at 07:50

## 2018-08-08 NOTE — PLAN OF CARE
Problem: Patient Care Overview  Goal: Plan of Care/Patient Progress Review  Outpatient/Observation goals to be met before discharge home:        -Return to baseline mental status, - Yes  -Hypercapnia, hypoventilation or hypoxia resolved for at least 2 hours without supplemental oxygen, - No, dyspneic on exertion when out of the BR .  -Deficits in sensation, mobility or coordination have resolved if spinal or regional anesthesia was used.  _ Yes    Notify Provider when observation goals have been met and patient is ready for discharge.     Voided , ambulates with walker. Denies pain and ate I/3 of a sandwich.

## 2018-08-08 NOTE — PROGRESS NOTES
REAGAN teaching completed. Discharge instructions given to patient and all questions answered. Patient able to verbalize understanding of instructions. Patient able to teach back REAGAN care instructions. All goals met for discharge. PIV discontinued. Patient waiting for family to arrive to provide transportation home. Patient will discharge at that time.

## 2018-08-08 NOTE — DISCHARGE SUMMARY
Discharge Summary  Hanna Mcmahon  [id number]  1940    Date of Admission: 8/7/2018  Date of Discharge: 8/8/2018    Admission Diagnosis: Hurthle Cell Carcinoma   Hurthle cell carcinoma (H)  Discharge Diagnosis: Same    Procedures:  Date: 8/7/2018  Procedure(s):  THYROIDECTOMY    Pathology: pending    HPI: Hanna Mcmahon is a 78 year old female with history of CAD, MI s/p stenting, aortic stenosis, PAD s/p internal illiac stenting, and AAA repair (2012). 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      It was recommended that she undergo operative intervention and the patient consented to the above procedure after detailed explanation of the risks and benefits of said procedure.    Hospital Course: The patient was admitted to the hospital and underwent the above mentioned procedure. She tolerated the procedure without any intra- or serenity-operative complications. Please see the operative report for full details of the procedure. The patient was admitted for post-operative monitoring due to inadequate pain control on oral medications and supplemental oxygen requirements post-op. Her postoperative course was uneventful. She was weaned off of supplemental oxygen. At discharge, the patient's pain was well controlled on oral medications alone, the patient was voiding on her own, and she was ambulating and tolerating a regular diet.     Discharge Exam:  Vitals:    08/08/18 0012 08/08/18 0310 08/08/18 0500 08/08/18 0604   BP: 146/63 132/54  120/69   BP Location: Right arm Right arm  Right arm   Resp:  20  20   Temp:  98.3  F (36.8  C)  97.4  F (36.3  C)   TempSrc:  Oral  Oral   SpO2:  97% 96% 97%   Weight:       Height:           General: A&O x 3, No acute distress  HEENT: PERRL, EOMI without spontaneous or gaze evoked nystagmus. Anterior neck incision is clean, dry, and intact; no bleeding no drainage. REAGAN  in place with serosanguinous output. Neck flat without evidence of hematoma or fluid collection.   Respiratory: Breathing non-labored on room air, no stridor, no accessory muscle use.      Hanna Mcmahon   Home Medication Instructions JE:[id number]    Printed on:08/08/18 0848   Medication Information                      acetaminophen (TYLENOL) 325 MG tablet  Take 2 tablets (650 mg) by mouth every 4 hours as needed for other (mild pain)             albuterol (PROAIR HFA) 108 (90 Base) MCG/ACT Inhaler  Inhale into the lungs as needed              amLODIPine (NORVASC) 5 MG tablet  Take 5 mg by mouth every morning              atorvastatin (LIPITOR) 80 MG tablet  Take 80 mg by mouth At Bedtime              calcium carbonate-vitamin D (SM CALCIUM 500/VITAMIN D3) 500-400 MG-UNIT TABS per tablet  Take 1 tablet by mouth every morning              furosemide (LASIX) 40 MG tablet  Take 40 mg by mouth every morning              levothyroxine (SYNTHROID/LEVOTHROID) 88 MCG tablet  Take 88 mcg by mouth every morning              metoprolol tartrate (LOPRESSOR) 50 MG tablet  Take 50 mg by mouth 2 times daily              mometasone-formoterol (DULERA) 100-5 MCG/ACT oral inhaler  Inhale 2 puffs into the lungs as needed              oxyCODONE IR (ROXICODONE) 5 MG tablet  Take 1-2 tablets (5-10 mg) by mouth every 4 hours as needed for pain or other (Moderate to Severe)             potassium chloride SA (K-DUR/KLOR-CON M) 10 MEQ CR tablet  Take 10 mEq by mouth every morning              tiotropium (SPIRIVA) 18 MCG capsule  Inhale 18 mcg into the lungs as needed              VITAMIN D, CHOLECALCIFEROL, PO  Take by mouth every morning                     Discharge Procedure Orders  Diet Instructions   Order Comments: Resume pre procedure diet     Discharge Instructions - Lifting restrictions   Order Comments: Lifting Restrictions 10 pounds until seen at Post-op follow up appointment     No driving or operating machinery   Order  "Comments: until the day after procedure or while taking narcotic pain medication     Notify Physician    Order Comments: Please notify your doctor if you experience wound breakdown, sustained bleeding from the wound site, or increasing redness, swelling, and/or purulent malorodorous discharge from the wound site which may indicate infection. If you feel it is acute, please return to the emergency department. If you have questions or concerns during the day please call ENT clinic and [phone]. If at night you can call [facility] at [phone] and ask for the \"ENT resident on call\".     Discharge Instructions   Order Comments: Keep neck wound clean and dry. Do not get wet while drain is in place. May shower from shoulders down and have someone help clean hair. Apply Aquaphor ointment to incision three times daily. Once drain is out, 24 hours after that you may shower completely. No scrubbing the incision.     Reason for your hospital stay   Order Comments: Post-operative care     Adult [facility] Follow-up and recommended labs and tests   Order Comments: Follow up in ENT clinic with Dr. Herrera on Friday, 8/17 at 3PM. Please call the clinic with questions/concerns: [phone].    Otolaryngology/ENT Clinic:  [facility]  [address] [place] [address]    Appointments on [employer] and/or [facility] (with [facility] or [facility] provider or service). Call [phone] if you haven't heard regarding these appointments within 7 days of discharge.     Monitor and record   Order Comments: You are going home with surgical drains in place. Empty the drains and record output 3 times per day. Squeeze the bulb of the drain and replace cap.  If you have multiple drains, record the outputs separately. Once output is less than 30 mL in 24 hours, please contact the ENT clinic to schedule an appointment for drain removal.     Full Code         Dispo: To home in " good condition. All of the patient's questions/concerns have been addressed at this time.     Tamela Shaver PA-C  Otolaryngology-Head & Neck Surgery  Please contact ENT by dialing [phone] and entering job code [phone].

## 2018-08-08 NOTE — OP NOTE
Date of Procedure: 8/7/2018    Attending Physician: Sophie Herrera MD    Resident Physicians:   1. Aracelis Hong MD  2. Emilie Mark MD    Procedure Performed:  Left hemithyroidectomy    Preoperative Diagnosis: Hurthle cell neoplasm    Postoperative Diagnosis: same    Anesthesia: General    Blood loss: 50 cc    Specimens:   ID Type Source Tests Collected by Time Destination   A : left hemithyroid Organ Thyroid, left SURGICAL PATHOLOGY EXAM Sophie Herrera MD 8/7/2018  4:13 PM          Implants:  REAGAN drain ×1    Complications: None    Findings:  Enlarged left thyroid lobe containing nodule  Left recurrent laryngeal nerve identified and preserved with stimulation at 0.45 mA and movement noted in the PCA muscles on finger palpation and on the nerve monitor  Parathyroid glands left intact on the left side    Indications:  Hanna Mcmahon is a 78-year-old woman with multiple comorbidities who was found to have a left thyroid nodule measuring 4.5 cm.  On FNA this was consistent with follicular lesion with Hurthle cell features.  She is indicated for left hemithyroidectomy.    Description of Procedure:  After informed consent was obtained, the patient was brought back to the main operating room and placed in a supine position.  General anesthesia was induced and the patient was orotracheally intubated using a NIMS endotracheal tube.  Position was verified using the glidescope.  The arms were tucked.  A shoulder roll was placed.  She had minimal extension given her body habitus.  The planned incision was marked in a natural skin crease at approximately the level of the cricoid and injected with 1% lidocaine with 1-100,000 epinephrine.  The patient was prepped and draped in sterile fashion.  A timeout was performed.  A 15 blade was used to make an incision through the skin.  This was extended down towards the strap muscles.  Skin flaps were raised superiorly up towards the thyroid notch and inferiorly towards  the sternum.  The midline raphae of the strap muscles was identified and divided.  A communicating branch between the anterior jugular veins was divided inferiorly.  The sternohyoid and sternothyroid muscles on the left side were bluntly released off of the anterior surface of the thyroid gland.  The thyroid gland could be visualized as being relatively enlarged on the left side consistent with the preoperative 4.5 cm nodule.  The left thyroid lobe was also hypervascular.  The tissue planes were difficult to identify secondary to the vascularity.  The trachea was identified inferiorly and the cricoid was identified superiorly at the midline.  The thyroid isthmus was divided which was relatively small in size.  We then started our dissection near the superior aspect of the thyroid gland.  The thyroid was released off the attachments superiorly to the thyroid cartilage.  Care was taken during this dissection to dissect close to the gland itself.  We continued our dissection laterally towards the superior pole.  The superior pole vessels were suture-ligated.  We then continued to work inferiorly along the lateral border of the gland, releasing along the thyroid capsule.  The middle thyroid vessels were clipped and divided.  We continued to release the thyroid lobe working inferiorly.  Of note the thyroid lobe extended slightly below the sternum but was able to be bluntly dissected and released above the clavicle.  The inferior thyroid vessels were divided close to the gland.  Once the thyroid lobe had been circumferentially released along its lateral portion it was retracted medially to assist with visualization along the tracheoesophageal groove and the cricothyroid joint.  Blunt dissection was performed with the Saenz dissector in this region to try to identify the recurrent laryngeal nerve.  During this dissection the parathyroid glands were identified and left in place.  The gland was continued to be released  along the cricothyroid joint taking care to stimulate anything in this region. This dissection was prolonged and tedious secondary to the vascularity of the gland. The gland was released off of the trachea at Berry's ligament.  During the dissection of the gland the capsule of the thyroid was violated with the retractors and sutures were placed to help contain the tumor.  Once the gland was released we continue to dissect in the region of the cricothyroid joint as there was difficulty in clearly identifying the nerve along its length although stimulation was obtained with the Pra probe.  Considerable time was performed bluntly dissecting in this region.  The nerve could be stimulated but could not be easily visualized.  There was a portion of Mccarthy's ligament sitting over the nerve but ultimately was able to be seen and traced from the cricothyroid joint down toward the sternum.  The nerve on the side was sitting very medially in a almost vertical position relative to the trachea.  The nerve was tested at 0.45 mA with activity noted on the nerve monitor.  Additionally on finger palpation the PCA muscles could be felt stimulated on the left side.  The wound was thoroughly irrigated with saline.  Hemostasis was ensured with the bipolar cautery.  The nerve was again tested with good movement.  Surgicel was placed over the nerve in the region of Berry's ligament.  A 10 flat REAGAN drain was placed in the neck and secured with a 3-0 nylon.  The strap muscles were sutured together using a 3-0 Vicryl in horizontal mattress fashion.  The incision was closed with a buried interrupted 3-0 Vicryl followed by a running subcuticular 4-0 Monocryl.  Dermabond was applied to the incision.  The patient was handed back to anesthesia for extubation.  She was taken to the recovery room in stable condition with no immediate complications.    I was present for and participated in the entire procedure.      Sophie Herrera MD  Assistant    Department of Otolaryngology

## 2018-08-08 NOTE — PLAN OF CARE
"Problem: Patient Care Overview  Goal: Plan of Care/Patient Progress Review  Outpatient/Observation goals to be met before discharge home:      -Return to baseline mental status, - Yes  -Hypercapnia, hypoventilation or hypoxia resolved for at least 2 hours without supplemental oxygen, - Yes, on RA sats 98  -Deficits in sensation, mobility or coordination have resolved if spinal or regional anesthesia was used.  _ Yes    Notify Provider when observation goals have been met and patient is ready for discharge.  /69 (BP Location: Right arm)  Temp 97.4  F (36.3  C) (Oral)  Resp 20  Ht 1.549 m (5' 1\")  Wt 73.9 kg (162 lb 14.7 oz)  SpO2 97%  BMI 30.78 kg/m2                "

## 2018-08-08 NOTE — PLAN OF CARE
"Problem: Patient Care Overview  Goal: Plan of Care/Patient Progress Review  Outpatient/Observation goals to be met before discharge home:  /66  Temp 97.9  F (36.6  C) (Oral)  Resp 18  Ht 1.549 m (5' 1\")  Wt 73.9 kg (162 lb 14.7 oz)  SpO2 96%  BMI 30.78 kg/m2   Observation goals: Delayed Recovery from Anesthesia PRIOR TO DISCHARGE       -Return to baseline mental status, - Yes  -Hypercapnia, hypoventilation or hypoxia resolved for at least 2 hours without supplemental oxygen, - No   -Deficits in sensation, mobility or coordination have resolved if spinal or regional anesthesia was used.  _ Yes    Notify Provider when observation goals have been met and patient is ready for discharge.              "

## 2018-08-08 NOTE — PLAN OF CARE
Problem: Patient Care Overview  Goal: Plan of Care/Patient Progress Review  Outpatient/Observation goals to be met before discharge home:      -Return to baseline mental status, - Yes  -Hypercapnia, hypoventilation or hypoxia resolved for at least 2 hours without supplemental oxygen, - No, on 2 liters of oxygen.  -Deficits in sensation, mobility or coordination have resolved if spinal or regional anesthesia was used.  _ Yes    Notify Provider when observation goals have been met and patient is ready for discharge.

## 2018-08-09 ENCOUNTER — CARE COORDINATION (OUTPATIENT)
Dept: OTOLARYNGOLOGY | Facility: CLINIC | Age: 78
End: 2018-08-09

## 2018-08-09 NOTE — PROGRESS NOTES
ENT Discharge Follow-Up      Responsible Attending Physician: Dr. Herrera  Date of Discharge:  8/7/18  Discharge to:  Home    Current Status:  Pt is a 77 y/o female s/p Left hemithyroidectomy on 8/7/18. Patient reports operative  pain is decreasing.  Ambulating without assistance.  Pain well controlled with current meds, ample supply.  Reports incision CDI without signs of infection.  Denies redness, swelling, increased tenderness, or elevated temp.  Denies current bowel or bladder issues.  REAGAN draining 35ml in last 24 hours. Patient most likely will be ready to have this removed tomorrow. She wishes to go to her local clinic for removal. Patient states that she has a call into PCP to schedule appointment for removal. She will call writer if she is unable to schedule with PCP or if it drain is not ready to be removed tomorrow.      Discharge instructions and medication use were reviewed.  RN triage/on call number given:  [phone] or after hours and w/e - [phone].  Patient verbalized understanding and agreement with current plan.    Follow up appointments/imaging/tests needed: Follow-up scheduled with Dr. Herrera on 8/17.     Elizabeth Lim, RN, BSN

## 2018-08-10 ENCOUNTER — TELEPHONE (OUTPATIENT)
Dept: OTOLARYNGOLOGY | Facility: CLINIC | Age: 78
End: 2018-08-10

## 2018-08-10 NOTE — TELEPHONE ENCOUNTER
Returned call to patient who states that she has had about 20ml in the last 24 hours in her REAGAN drain. She has an appointment scheduled with her local doctor on Monday for removal. Discussed with patient as long as drainage is less than 30 ml in 24 hours she can have the drain removed. She will call on Monday once the drain is removed. Patient agreeable to plan and will call with further questions or concerns.     Elizabeth Lim, RN, BSN

## 2018-08-10 NOTE — TELEPHONE ENCOUNTER
[facility] Call Center    Phone Message    May a detailed message be left on voicemail: yes    Reason for Call: Other: Pt has surgergy with Dr. Herrera for thyroid removal and would like to know if she can get her drainage tube removed at a hospital near her home instead of coming back to ENT. Her appt is scheduled for Monday and she would like a call back TODAY to confirm. Please f/u asap.     Action Taken: Message routed to:  [facility] ([facility] ENT

## 2018-08-13 LAB — COPATH REPORT: NORMAL

## 2018-08-17 ENCOUNTER — OFFICE VISIT (OUTPATIENT)
Dept: OTOLARYNGOLOGY | Facility: CLINIC | Age: 78
End: 2018-08-17
Payer: COMMERCIAL

## 2018-08-17 VITALS — BODY MASS INDEX: 30.58 KG/M2 | WEIGHT: 162 LBS | HEIGHT: 61 IN

## 2018-08-17 DIAGNOSIS — D34 HURTHLE CELL ADENOMA: Primary | ICD-10-CM

## 2018-08-17 ASSESSMENT — PAIN SCALES - GENERAL: PAINLEVEL: NO PAIN (0)

## 2018-08-17 NOTE — NURSING NOTE
Chief Complaint   Patient presents with     RECHECK     Post Op Recheck, hemithyroid     Ed Slater, ADELAIDAN

## 2018-08-17 NOTE — LETTER
8/17/2018       RE: Hanna Mcmahon  [address]  [facility] [address]     Dear Colleague,    Thank you for referring your patient, Hanna Mcmahon, to the [facility] AND THROAT at [facility]. Please see a copy of my visit note below.    History:   Hanna Mcmahon is a 78-year-old woman with a left-sided thyroid nodule incidentally found on physical exam.  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  Due to her medical comorbidities she was referred here for surgical resection.  She underwent a left hemithyroidectomy a week ago.  She is overall doing well.  She is not having any significant pain or difficulties.  Her drain was removed locally.  She did have questions today regarding her previously prescribed Synthroid and a recall that she was made aware of by the television.    Exam:  Patient in no apparent distress  Breathing comfortably on room air  Midline neck incision healing appropriately with Dermabond in place, no swelling, no fluid collection    Pathology:    SPECIMEN(S):   Left hemithyroid     FINAL DIAGNOSIS:   Thyroid, left hemithyroidectomy:   - Hurthle cell adenoma   - Tumor size: 4.4 cm   - Surgical resection margins free of tumor     COMMENT:   Multiple deeper sections were obtained.  No capsular or angiolymphatic   invasion is identified.  Ki67 stain   with the appropriate control reveals a proliferation index less than 5% in    the tumor cells.  These findings   are consistent with Hurthle cell adenoma.       Impression/plan:  Patient is now status post left hemithyroidectomy for a Hurthle cell adenoma.  The final pathology was discussed with her today including the benign nature.  She did not have any nodules on the contralateral thyroid on my review of her previous ultrasound report.  We discussed that she will need to use sun precautions for her incision for the next year.  She needs to have her TSH checked in about 4-6  weeks.  This can be done locally which she would prefer given the distance to travel here.  We looked online at the FDA website and it appears that her particular dose of Synthroid has not been recalled.  I am happy to see her back on an as-needed basis if any issues arise.    Sophie Herrera MD    Department of Otolaryngology        CC:  Jw Jensen  [address] [address]        Juan Carlos Salas MD  [address], [address]  [place] [address]        Zay Elizabeth MD  General Surgery  [place], [place]

## 2018-08-17 NOTE — MR AVS SNAPSHOT
"              After Visit Summary   8/17/2018    Hanna Mcmahon    MRN: [id number]           Patient Information     Date Of Birth          1940        Visit Information        Provider Department      8/17/2018 3:00 PM Sophie Herrera MD [facility] Ear Nose [facility] Thro[facility]        Today's Diagnoses     Hurthle cell adenoma    -  1       Follow-ups after your visit        Who to contact     Please call your clinic at [phone] to:    Ask questions about your health    Make or cancel appointments    Discuss your medicines    Learn about your test results    Speak to your doctor            Additional Information About Your Visit        MyChart Information     [employer] gives you secure access to your electronic health record. If you see a primary care provider, you can also send messages to your care team and make appointments. If you have questions, please call your primary care clinic.  If you do not have a primary care provider, please call [phone] and they will assist you.      [employer] is an electronic gateway that provides easy, online access to your medical records. With [employer], you can request a clinic appointment, read your test results, renew a prescription or communicate with your care team.     To access your existing account, please contact your [facility] or call [phone] for assistance.        Care EveryWhere ID     This is your Care EveryWhere ID. This could be used by other organizations to access your [place] medical records  [id number]        Your Vitals Were     Height BMI (Body Mass Index)                1.549 m (5' 1\") 30.61 kg/m2           Blood Pressure from Last 3 Encounters:   08/08/18 120/69   07/24/18 137/74    Weight from Last 3 Encounters:   08/17/18 73.5 kg (162 lb)   08/07/18 73.9 kg (162 lb 14.7 oz)   07/24/18 74.3 kg (163 lb 14.4 oz)              Today, you had the following     No orders found for display       Primary Care Provider Office " Phone # Fax #    Jw Jensen [phone]       [facility] [address]        Equal Access to Services     FAHAD CISNEROS : Hadii aad ku hadkadecandelario Teresaoneil, wamahoganyda luqadaha, qaybta kaalmada fausto, rodriguez odalisin hayaadanica chuaservando najera laTracyyaritza charles. So [facility] [phone].    ATENCIÓN: Si habla español, tiene a webster disposición servicios gratuitos de asistencia lingüística. Llame al [phone].    We comply with applicable federal civil rights laws and [place] laws. We do not discriminate on the basis of race, color, national origin, age, disability, sex, sexual orientation, or gender identity.            Thank you!     Thank you for choosing [facility] EAR NOSE AND TH[facility]AT  for your care. Our goal is always to provide you with excellent care. Hearing back from our patients is one way we can continue to improve our services. Please take a few minutes to complete the written survey that you may receive in the mail after your visit with us. Thank you!             Your Updated Medication List - Protect others around you: Learn how to safely use, store and throw away your medicines at www.disposemymeds.org.          This list is accurate as of 8/17/18 11:59 PM.  Always use your most recent med list.                   Brand Name Dispense Instructions for use Diagnosis    acetaminophen 325 MG tablet    TYLENOL    100 tablet    Take 2 tablets (650 mg) by mouth every 4 hours as needed for other (mild pain)    Postoperative pain       amLODIPine 5 MG tablet    NORVASC     Take 5 mg by mouth every morning        atorvastatin 80 MG tablet    LIPITOR     Take 80 mg by mouth At Bedtime        furosemide 40 MG tablet    LASIX     Take 40 mg by mouth every morning        levothyroxine 88 MCG tablet    SYNTHROID/LEVOTHROID     Take 88 mcg by mouth every morning        metoprolol tartrate 50 MG tablet    LOPRESSOR     Take 50 mg by mouth 2 times daily        mometasone-formoterol 100-5 MCG/ACT  oral inhaler    DULERA     Inhale 2 puffs into the lungs as needed        oxyCODONE IR 5 MG tablet    ROXICODONE    40 tablet    Take 1-2 tablets (5-10 mg) by mouth every 4 hours as needed for pain or other (Moderate to Severe)    Postoperative pain       potassium chloride SA 10 MEQ CR tablet    K-DUR/KLOR-CON M     Take 10 mEq by mouth every morning        PROAIR  (90 Base) MCG/ACT inhaler   Generic drug:  albuterol      Inhale into the lungs as needed        SM CALCIUM 500/VITAMIN D3 500-400 MG-UNIT Tabs per tablet   Generic drug:  calcium carbonate-vitamin D      Take 1 tablet by mouth every morning        tiotropium 18 MCG capsule    SPIRIVA     Inhale 18 mcg into the lungs as needed        VITAMIN D (CHOLECALCIFEROL) PO      Take by mouth every morning

## 2018-08-18 PROBLEM — D34 HURTHLE CELL ADENOMA: Status: ACTIVE | Noted: 2018-08-18

## 2018-08-18 NOTE — PROGRESS NOTES
History:   Hanna Mcmahon is a 78-year-old woman with a left-sided thyroid nodule incidentally found on physical exam.  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  Due to her medical comorbidities she was referred here for surgical resection.  She underwent a left hemithyroidectomy a week ago.  She is overall doing well.  She is not having any significant pain or difficulties.  Her drain was removed locally.  She did have questions today regarding her previously prescribed Synthroid and a recall that she was made aware of by the television.    Exam:  Patient in no apparent distress  Breathing comfortably on room air  Midline neck incision healing appropriately with Dermabond in place, no swelling, no fluid collection    Pathology:    SPECIMEN(S):   Left hemithyroid     FINAL DIAGNOSIS:   Thyroid, left hemithyroidectomy:   - Hurthle cell adenoma   - Tumor size: 4.4 cm   - Surgical resection margins free of tumor     COMMENT:   Multiple deeper sections were obtained.  No capsular or angiolymphatic   invasion is identified.  Ki67 stain   with the appropriate control reveals a proliferation index less than 5% in    the tumor cells.  These findings   are consistent with Hurthle cell adenoma.       Impression/plan:  Patient is now status post left hemithyroidectomy for a Hurthle cell adenoma.  The final pathology was discussed with her today including the benign nature.  She did not have any nodules on the contralateral thyroid on my review of her previous ultrasound report.  We discussed that she will need to use sun precautions for her incision for the next year.  She needs to have her TSH checked in about 4-6 weeks.  This can be done locally which she would prefer given the distance to travel here.  We looked online at the FDA website and it appears that her particular dose of Synthroid has not been recalled.  I am happy to see her back on an as-needed basis if any issues arise.    Sophie  MD Sharon    Department of Otolaryngology        CC:  Jw Jensen  [address] [address]        Juan Carlos Salas MD  [address]  [place] [address]        Zay Elizabeth MD  General Surgery  [place], [place]

## 2020-01-01 ENCOUNTER — APPOINTMENT (OUTPATIENT)
Dept: RADIATION THERAPY | Facility: OUTPATIENT CENTER | Age: 80
End: 2020-01-01
Payer: COMMERCIAL

## 2020-01-01 ENCOUNTER — TRANSFERRED RECORDS (OUTPATIENT)
Dept: HEALTH INFORMATION MANAGEMENT | Facility: CLINIC | Age: 80
End: 2020-01-01

## 2020-01-01 ENCOUNTER — OFFICE VISIT (OUTPATIENT)
Dept: RADIATION THERAPY | Facility: OUTPATIENT CENTER | Age: 80
End: 2020-01-01
Payer: COMMERCIAL

## 2020-01-01 ENCOUNTER — HEALTH MAINTENANCE LETTER (OUTPATIENT)
Age: 80
End: 2020-01-01

## 2020-01-01 ENCOUNTER — VIRTUAL VISIT (OUTPATIENT)
Dept: RADIATION THERAPY | Facility: OUTPATIENT CENTER | Age: 80
End: 2020-01-01
Payer: COMMERCIAL

## 2020-01-01 ENCOUNTER — DOCUMENTATION ONLY (OUTPATIENT)
Dept: RADIATION THERAPY | Facility: OUTPATIENT CENTER | Age: 80
End: 2020-01-01

## 2020-01-01 VITALS
WEIGHT: 119 LBS | HEART RATE: 72 BPM | DIASTOLIC BLOOD PRESSURE: 65 MMHG | BODY MASS INDEX: 22.48 KG/M2 | SYSTOLIC BLOOD PRESSURE: 126 MMHG

## 2020-01-01 VITALS
RESPIRATION RATE: 18 BRPM | HEART RATE: 74 BPM | SYSTOLIC BLOOD PRESSURE: 146 MMHG | DIASTOLIC BLOOD PRESSURE: 71 MMHG | WEIGHT: 119.6 LBS | BODY MASS INDEX: 22.6 KG/M2 | OXYGEN SATURATION: 98 %

## 2020-01-01 DIAGNOSIS — C34.92 ADENOCARCINOMA OF LEFT LUNG (H): Primary | ICD-10-CM

## 2020-01-01 DIAGNOSIS — C34.92 ADENOCARCINOMA OF LEFT LUNG (H): ICD-10-CM

## 2020-01-01 RX ORDER — BUDESONIDE AND FORMOTEROL FUMARATE DIHYDRATE 160; 4.5 UG/1; UG/1
1 AEROSOL RESPIRATORY (INHALATION) 2 TIMES DAILY
COMMUNITY
Start: 2020-01-01

## 2020-01-01 RX ORDER — CALCITONIN SALMON 200 [IU]/.09ML
1 SPRAY, METERED NASAL DAILY
COMMUNITY
Start: 2020-01-01

## 2020-01-01 ASSESSMENT — PAIN SCALES - GENERAL: PAINLEVEL: NO PAIN (0)

## 2020-09-21 PROBLEM — C34.92 ADENOCARCINOMA OF LEFT LUNG (H): Status: ACTIVE | Noted: 2020-01-01

## 2020-09-21 NOTE — PROGRESS NOTES
Department of Radiation Oncology  Radiation Therapy Center  [facility]  [address], [address]  [place] [address]  [phone]       Consultation Note    Name: Hanna Mcmahon MRN: [id number]   : 1940   Date of Service: 2020  Referring: Dr. Crockett     Reason for consultation: Non-small cell lung cancer, clinical T1 N0 M0, left lower lobe.  Evaluate role for radiation.    History of Present Illness   Ms. Mcmahon is a 80 year old female a diagnosis of non-small cell lung cancer, clinical T1 N0 M0, left lower lobe.  Evaluate potential role of radiation therapy.    The patient has a history of multiple lung nodules which have been followed with serial imaging.  More recently the patient underwent a PET scan on 8/10/2020.  Imaging demonstrated increase in size of the left lower lobe mass from 1.3 cm to 1.7 cm.  Metabolic activity is also increased, previously 2.8 to 6.7 SUV.  There were also noted a small mildly active nodule in the left upper lobe, max SUV 3.0, previously 2.8 not significantly changed.  Scattered faint groundglass nodules were noted, were stable.  No lymphadenopathy  Or distant disease noted.  On 2020 the patient underwent left lower lobe lung biopsy.  Pathology confirmed adenocarcinoma, lung origin.  The patient was self referred to our clinic by Dr. Crockett to discuss potential role of SBRT.    Of note, the patient has a longstanding diagnosis of CML.  She currently has progression of disease and will likely initiate systemic therapy once radiation therapy is completed.  No prior radiation.  No pacemaker.        Past Medical History:   Past Medical History:   Diagnosis Date     Aortic stenosis 2018    mild to mod     CAD (coronary artery disease)      CML (chronic myelocytic leukemia) (H)     in remission     COPD (chronic obstructive pulmonary disease) (H)      GERD (gastroesophageal reflux disease)      History of acute myocardial infarction  1980    s/p stent     Hyperlipidemia      Hypertension      PAD (peripheral artery disease) (H)     s/p internal iliac artery stent and AAA repair in 2012     Polymyalgia rheumatica (H)      Restless legs syndrome        Past Surgical History:   Past Surgical History:   Procedure Laterality Date     AAA REPAIR       APPENDECTOMY       CHOLECYSTECTOMY       ENDOSCOPIC RETROGRADE CHOLANGIOPANCREATOGRAPHY       internal iliac artery stent       STENT, CORONARY, ARMAND       THYROIDECTOMY Left 8/7/2018    Procedure: THYROIDECTOMY;  Left Hemithyroidectomy;  Surgeon: Sophie Herrera MD;  Location: [facility] OR     UPPER EUS         Chemotherapy History:  Per HPI    Radiation History:  None    Pregnant: Not Applicable  Implanted Cardiac Devices: No    Medications:  Current Outpatient Medications   Medication     albuterol (PROAIR HFA) 108 (90 Base) MCG/ACT Inhaler     amLODIPine (NORVASC) 5 MG tablet     atorvastatin (LIPITOR) 80 MG tablet     calcitonin, salmon, (MIACALCIN) 200 UNIT/ACT nasal spray     calcium carbonate-vitamin D (OSCAL W/D) 500-200 MG-UNIT tablet     calcium carbonate-vitamin D (SM CALCIUM 500/VITAMIN D3) 500-400 MG-UNIT TABS per tablet     furosemide (LASIX) 40 MG tablet     levothyroxine (SYNTHROID/LEVOTHROID) 88 MCG tablet     metoprolol tartrate (LOPRESSOR) 50 MG tablet     SYMBICORT 160-4.5 MCG/ACT Inhaler     tiotropium (SPIRIVA) 18 MCG capsule     acetaminophen (TYLENOL) 325 MG tablet     mometasone-formoterol (DULERA) 100-5 MCG/ACT oral inhaler     oxyCODONE IR (ROXICODONE) 5 MG tablet     potassium chloride SA (K-DUR/KLOR-CON M) 10 MEQ CR tablet     No current facility-administered medications for this visit.          Allergies:     Allergies   Allergen Reactions     Benazepril Swelling     Family History:  No family history on file.    Review of Systems   A 10-point review of systems was performed. Pertinent findings are noted in the HPI.      Imaging/Path/Labs   Imaging:   PET  IMPRESSION:  1.  Metabolically active enlarging spiculated nodule superior segment left lower lobe SUV max 6.7, previously 2.9. A primary malignancy is suspected.  2. Small mildly metabolically active nodule along the anterior medial left mediastinum, SUV max 3.0, previously 2.8 not significantly changed.  3. Scattered faint ground-glass nodules or opacities in the upper lobes and right lower lobe are stable and can be followed.   4. Several foci of increased activity in the skeleton as described above as well as a background of increased skeletal activity, nonspecific but potentially related to patient`s underlying chronic myelomonocytic leukemia. A metastatic disease process could not be entirely excluded but is considered less likely.  5. Simple compression fracture of L1 new when compared to the PET/CT scan February 19, 2020.        Path:     8/21/20  A) NODULE, LEFT LOWER LUNG LOBE, CT-GUIDED BIOPSY:  1. Poorly differentiated adenocarcinoma, consistent with lung origin   2. Insufficient material for Next Generation Sequencing analysis  3. PD-L1 ancillary testing has been ordered; results to follow in an amendment        Assessment    Ms. Mcmahon is a 80 year old female a diagnosis of non-small cell lung cancer, clinical T1 N0 M0, left lower lobe.  Evaluate potential role of radiation therapy.      Plan     1. The patient appears to have multiple lung nodules in bilateral lung fields, likely consistent with NISHA picture.     2. A LLL nodule has increased in size and PET avidity, prompting biopsy which confirmed NSCLC. She has an additional nodule in the ALYSSA (small, and low activity, overall stable).     3. I discussed treatment with SBRT to the LLL nodule with consideration of treatment of the nearby ALYSSA depending upon lung dose if possible. Otherwise, plan would be to treat LLL and continue to observe with consideration of RT down the road if growth is demonstrated.     4. I plan to treat 50 Gy in 5-10 fractions. Side effects  discussed in detail. Will schedule patient for CT simulation.     Due to the concerns around COVID-19 and adhering to social distancing we conduct this visit over the telephone. Telephone call lasted 45 minutes.         Aime Gan M.D.  Department of Radiation Oncology  [facility]

## 2020-09-21 NOTE — NURSING NOTE
"REASON FOR APPOINTMENT   Type of Cancer: LLL adeno  Date of Symptom Onset: pt being followed for CML and lung nodules. Changes in lung nodules - bx LLL 8/21/20 showed adenocarcinoma    TREATMENT TO-DATE FOR THIS CANCER  Surgery ? Not offered   Chemotherapy ? Not for lung. Will start therapy for CML with Dr. Crockett soon.   Other Treatments for this Cancer ? Discussion for SBRT (10 fxns) to LLL.    PERSONAL HISTORY OF CANCER   Previous Cancer ? CML [id number] procrit, was off therapy since, now showing progression and talk of starting infusion thearpy   Prior Radiation ? no   Prior Chemotherapy ? no   Prior Hormonal Therapy ? no     RECENT IMAGING STUDIES  CT scan   PET 8/10/20,  2/21/20    REFERRALS NEEDED  None at this time    VITALS  There were no vitals taken for this visit.    PACEMAKER/IMPLANTED CARDIAC DEVICE no    PAIN  Denies    PSYCHOSOCIAL  Marital Status:   Patient lives in [place] with senior living apartment. Independent. Grand daughter Daly checks in often and other family available to help as well.  Number of children:   Working status: retired   Do you feel safe in your home? Yes    REVIEW OF SYSTEMS  Skin: negative  Eyes: glasses  Ears/Nose/Throat: negative  Respiratory: No shortness of breath, dyspnea on exertion, cough, or hemoptysis. Baseline COPD - on 3x/day neb and spriiva, symbicort - PCP helps with steroids/abx if COPD exacerbation - has been stable.  Cardiovascular: negative  Gastrointestinal: negative  Genitourinary: negative  Musculoskeletal: arthritis and joint pain  Neurologic: negative  Psychiatric: negative  Hematologic/Lymphatic/Immunologic: negative  Endocrine: thyroid disorder    WOMEN ONLY  Any chance you may be pregnant: No      Radiation Oncology Patient Teaching    Current Concern: \"how will I feel from radiation?\"    Person involved with teaching: Patient and Granddaughter Daly - over the phone consult  Patient asked Questions: Yes  Patient was cooperative: " Yes  Patient was receptive (willing to accept information given): Yes    Education Assessment  Comprehension ability: Medium  Knowledge level: Low  Factors affecting teaching: new information, phone consult    Education Materials Given  Radiation Therapy and You - not given today due to phone - will mail packet    Educational Topics Discussed  Side effects, Activity, Nutrition, Adjustment to illness and When to call MD/RN    Response To Teaching  More review necessary    GYN Only  Vaginal Dilator-given and educated: N/A    Do you have an advanced directive or living will? yes  Are you DNR/DNI?   Partial  - CPR ok per pt, NO Intubation.

## 2020-09-21 NOTE — LETTER
2020         RE: Hanna Mcmahon  [address]  [facility] [address]        Dear Colleague,    Thank you for referring your patient, Hanna Mcmahon, to the [facility]. Please see a copy of my visit note below.       Department of Radiation Oncology  Radiation Therapy Center  [facility]  [address], [address]  [place] [address]  [phone]       Consultation Note    Name: Hanna Mcmahon MRN: [id number]   : 1940   Date of Service: 2020  Referring: Dr. Crockett     Reason for consultation: Non-small cell lung cancer, clinical T1 N0 M0, left lower lobe.  Evaluate role for radiation.    History of Present Illness   Ms. Mcmahon is a 80 year old female a diagnosis of non-small cell lung cancer, clinical T1 N0 M0, left lower lobe.  Evaluate potential role of radiation therapy.    The patient has a history of multiple lung nodules which have been followed with serial imaging.  More recently the patient underwent a PET scan on 8/10/2020.  Imaging demonstrated increase in size of the left lower lobe mass from 1.3 cm to 1.7 cm.  Metabolic activity is also increased, previously 2.8 to 6.7 SUV.  There were also noted a small mildly active nodule in the left upper lobe, max SUV 3.0, previously 2.8 not significantly changed.  Scattered faint groundglass nodules were noted, were stable.  No lymphadenopathy  Or distant disease noted.  On 2020 the patient underwent left lower lobe lung biopsy.  Pathology confirmed adenocarcinoma, lung origin.  The patient was self referred to our clinic by Dr. Crockett to discuss potential role of SBRT.    Of note, the patient has a longstanding diagnosis of CML.  She currently has progression of disease and will likely initiate systemic therapy once radiation therapy is completed.  No prior radiation.  No pacemaker.        Past Medical History:   Past Medical History:   Diagnosis Date     Aortic stenosis 2018    mild to  mod     CAD (coronary artery disease)      CML (chronic myelocytic leukemia) (H)     in remission     COPD (chronic obstructive pulmonary disease) (H)      GERD (gastroesophageal reflux disease)      History of acute myocardial infarction 1980    s/p stent     Hyperlipidemia      Hypertension      PAD (peripheral artery disease) (H)     s/p internal iliac artery stent and AAA repair in 2012     Polymyalgia rheumatica (H)      Restless legs syndrome        Past Surgical History:   Past Surgical History:   Procedure Laterality Date     AAA REPAIR       APPENDECTOMY       CHOLECYSTECTOMY       ENDOSCOPIC RETROGRADE CHOLANGIOPANCREATOGRAPHY       internal iliac artery stent       STENT, CORONARY, ARMAND       THYROIDECTOMY Left 8/7/2018    Procedure: THYROIDECTOMY;  Left Hemithyroidectomy;  Surgeon: Sophie Herrera MD;  Location: [facility] OR     UPPER EUS         Chemotherapy History:  Per HPI    Radiation History:  None    Pregnant: Not Applicable  Implanted Cardiac Devices: No    Medications:  Current Outpatient Medications   Medication     albuterol (PROAIR HFA) 108 (90 Base) MCG/ACT Inhaler     amLODIPine (NORVASC) 5 MG tablet     atorvastatin (LIPITOR) 80 MG tablet     calcitonin, salmon, (MIACALCIN) 200 UNIT/ACT nasal spray     calcium carbonate-vitamin D (OSCAL W/D) 500-200 MG-UNIT tablet     calcium carbonate-vitamin D (SM CALCIUM 500/VITAMIN D3) 500-400 MG-UNIT TABS per tablet     furosemide (LASIX) 40 MG tablet     levothyroxine (SYNTHROID/LEVOTHROID) 88 MCG tablet     metoprolol tartrate (LOPRESSOR) 50 MG tablet     SYMBICORT 160-4.5 MCG/ACT Inhaler     tiotropium (SPIRIVA) 18 MCG capsule     acetaminophen (TYLENOL) 325 MG tablet     mometasone-formoterol (DULERA) 100-5 MCG/ACT oral inhaler     oxyCODONE IR (ROXICODONE) 5 MG tablet     potassium chloride SA (K-DUR/KLOR-CON M) 10 MEQ CR tablet     No current facility-administered medications for this visit.          Allergies:     Allergies   Allergen Reactions      Benazepril Swelling     Family History:  No family history on file.    Review of Systems   A 10-point review of systems was performed. Pertinent findings are noted in the HPI.      Imaging/Path/Labs   Imaging:   PET  IMPRESSION:  1. Metabolically active enlarging spiculated nodule superior segment left lower lobe SUV max 6.7, previously 2.9. A primary malignancy is suspected.  2. Small mildly metabolically active nodule along the anterior medial left mediastinum, SUV max 3.0, previously 2.8 not significantly changed.  3. Scattered faint ground-glass nodules or opacities in the upper lobes and right lower lobe are stable and can be followed.   4. Several foci of increased activity in the skeleton as described above as well as a background of increased skeletal activity, nonspecific but potentially related to patient`s underlying chronic myelomonocytic leukemia. A metastatic disease process could not be entirely excluded but is considered less likely.  5. Simple compression fracture of L1 new when compared to the PET/CT scan February 19, 2020.        Path:     8/21/20  A) NODULE, LEFT LOWER LUNG LOBE, CT-GUIDED BIOPSY:  1. Poorly differentiated adenocarcinoma, consistent with lung origin   2. Insufficient material for Next Generation Sequencing analysis  3. PD-L1 ancillary testing has been ordered; results to follow in an amendment        Assessment    Ms. Mcmahon is a 80 year old female a diagnosis of non-small cell lung cancer, clinical T1 N0 M0, left lower lobe.  Evaluate potential role of radiation therapy.      Plan     1. The patient appears to have multiple lung nodules in bilateral lung fields, likely consistent with NISHA picture.     2. A LLL nodule has increased in size and PET avidity, prompting biopsy which confirmed NSCLC. She has an additional nodule in the ALYSSA (small, and low activity, overall stable).     3. I discussed treatment with SBRT to the LLL nodule with consideration of treatment of the nearby ALYSSA  depending upon lung dose if possible. Otherwise, plan would be to treat LLL and continue to observe with consideration of RT down the road if growth is demonstrated.     4. I plan to treat 50 Gy in 5-10 fractions. Side effects discussed in detail. Will schedule patient for CT simulation.     Aime Gan M.D.  Department of Radiation Oncology  [facility]

## 2020-09-28 NOTE — PROGRESS NOTES
The patient underwent  CT simulation.     Aime Gan M.D.  Department of Radiation Oncology  [facility]

## 2020-09-28 NOTE — LETTER
9/28/2020         RE: Hanna Mcmahon  [address]  [place] [place] [address]        Dear Colleague,    Thank you for referring your patient, Hanna Mcmahon, to the [facility]. Please see a copy of my visit note below.    The patient presents for CT simulation.     Consent obtained.     Aime Gan M.D.  Department of Radiation Oncology  [facility]       Again, thank you for allowing me to participate in the care of your patient.        Sincerely,        Aime Gan MD

## 2020-09-28 NOTE — LETTER
9/28/2020         RE: Hanna Mcmahno  [address]  [facility] [address]        Dear Colleague,    Thank you for referring your patient, Hanna Mcmahon, to the RAD[facility]ATION TH[facility]APY CENTER. Please see a copy of my visit note below.    The patient underwent  CT simulation.     Aime Gan M.D.  Department of Radiation Oncology  [facility]       Again, thank you for allowing me to participate in the care of your patient.        Sincerely,        Aime Gan MD

## 2020-09-28 NOTE — PROGRESS NOTES
The patient presents for CT simulation.     Consent obtained.     Aime Gan M.D.  Department of Radiation Oncology  [facility]

## 2020-10-14 NOTE — PROGRESS NOTES
[facility]  SPECIALIZING IN BREAKTHROUGHS  Radiation Oncology    On Treatment Visit Note      Hanna Mcmahon      Date: 10/14/2020   MRN: [id number]   : 1940         Reason for Visit:  On Radiation Treatment Visit     Treatment Summary to Date   Left lung    1500 cGy/ 5000 cGy   3/10          Subjective:   Doing well. No acute trouble. No respiratory change. Energy adequate.     Nursing ROS:         Objective:   There were no vitals taken for this visit.    Labs:  CBC RESULTS:   Recent Labs   Lab Test 18  1221   HGB 11.6*     ELECTROLYTES:  Recent Labs   Lab Test 18  1221 18  1110   POTASSIUM 3.6 3.6   CR  --  1.17*       Assessment:   Ms. Mcmahon is a 80 year old female a diagnosis of non-small cell lung cancer, clinical T1 N0 M0, left lower lobe. She is undergoing hypofractionated RT.     Tolerating radiation therapy well.  All questions and concerns addressed.    Plan:   1. Continue current therapy.        Mosaiq chart and setup information reviewed  Ports checked                Aime Gan MD

## 2020-10-14 NOTE — LETTER
10/14/2020         RE: Hanna Mcmahon  [address]  [place] [address]        Dear Colleague,    Thank you for referring your patient, Hanna Mcmahon, to the RAD[facility]ATION TH[facility]APY CENTER. Please see a copy of my visit note below.    [facility]  SPECIALIZING IN BREAKTHROUGHS  Radiation Oncology    On Treatment Visit Note      Hanna Mcmahon      Date: 10/14/2020   MRN: [id number]   : 1940         Reason for Visit:  On Radiation Treatment Visit     Treatment Summary to Date   Left lung    1500 cGy/ 5000 cGy   3/10          Subjective:   Doing well. No acute trouble. No respiratory change. Energy adequate.     Nursing ROS:         Objective:   There were no vitals taken for this visit.    Labs:  CBC RESULTS:   Recent Labs   Lab Test 18  1221   HGB 11.6*     ELECTROLYTES:  Recent Labs   Lab Test 18  1221 18  1110   POTASSIUM 3.6 3.6   CR  --  1.17*       Assessment:   Ms. Mcmahon is a 80 year old female a diagnosis of non-small cell lung cancer, clinical T1 N0 M0, left lower lobe. She is undergoing hypofractionated RT.     Tolerating radiation therapy well.  All questions and concerns addressed.    Plan:   1. Continue current therapy.        Mosaiq chart and setup information reviewed  Ports checked         Again, thank you for allowing me to participate in the care of your patient.        Sincerely,        Aime Gan MD

## 2020-10-21 NOTE — LETTER
10/21/2020         RE: Hanna Mcmahon  [address]  [place] [address]        Dear Colleague,    Thank you for referring your patient, Hanna Mcmahon, to the RAD[facility]ATION THERAPY CENTER. Please see a copy of my visit note below.    [facility]  SPECIALIZING IN BREAKTHROUGHS  Radiation Oncology    On Treatment Visit Note      Hanna Mcmahon      Date: 10/21/2020   MRN: [id number]   : 1940         Reason for Visit:  On Radiation Treatment Visit     Treatment Summary to Date   Left lung    4000 cGy/ 5000 cGy   8/10          Subjective:   Doing well. No acute trouble. No respiratory change. Energy adequate.     Nursing ROS:         Objective:   BP (!) 146/71   Pulse 74   Resp 18   Wt 54.3 kg (119 lb 9.6 oz)   SpO2 98%   BMI 22.60 kg/m    NAD    Labs:  CBC RESULTS:   Recent Labs   Lab Test 18  1221   HGB 11.6*     ELECTROLYTES:  Recent Labs   Lab Test 18  1221 18  1110   POTASSIUM 3.6 3.6   CR  --  1.17*       Assessment:   Ms. Mcmahon is a 80 year old female a diagnosis of non-small cell lung cancer, clinical T1 N0 M0, left lower lobe. She is undergoing hypofractionated RT.     Tolerating radiation therapy well.  All questions and concerns addressed.    Plan:   1. Continue current therapy.    2. EOT this Friday. Continue follow up with Dr. Crockett. Defer scans to medical oncology. Will plan to schedule telephone visit after scans to also discuss.       Mosaiq chart and setup information reviewed  Ports checked           Aime Gan MD

## 2020-10-21 NOTE — PROGRESS NOTES
[facility]  SPECIALIZING IN BREAKTHROUGHS  Radiation Oncology    On Treatment Visit Note      Hanna Mcmahon      Date: 10/21/2020   MRN: [id number]   : 1940         Reason for Visit:  On Radiation Treatment Visit     Treatment Summary to Date   Left lung    4000 cGy/ 5000 cGy   8/10          Subjective:   Doing well. No acute trouble. No respiratory change. Energy adequate.     Nursing ROS:         Objective:   BP (!) 146/71   Pulse 74   Resp 18   Wt 54.3 kg (119 lb 9.6 oz)   SpO2 98%   BMI 22.60 kg/m    NAD    Labs:  CBC RESULTS:   Recent Labs   Lab Test 18  1221   HGB 11.6*     ELECTROLYTES:  Recent Labs   Lab Test 18  1221 18  1110   POTASSIUM 3.6 3.6   CR  --  1.17*       Assessment:   Ms. Mcmahon is a 80 year old female a diagnosis of non-small cell lung cancer, clinical T1 N0 M0, left lower lobe. She is undergoing hypofractionated RT.     Tolerating radiation therapy well.  All questions and concerns addressed.    Plan:   1. Continue current therapy.    2. EOT this Friday. Continue follow up with Dr. Crockett. Defer scans to medical oncology. Will plan to schedule telephone visit after scans to also discuss.       Mosaiq chart and setup information reviewed  Ports checked                Aime Gan MD

## 2020-11-03 NOTE — PROGRESS NOTES
Department of Radiation Oncology  Radiation Therapy Center  [facility] [address]  [phone]       Radiotherapy Treatment Summary          Treatment dates: 10/12/20-10/23/20    PATIENT: Hanna Mcmahon  MEDICAL RECORD NO: [id number]   : 1940    DIAGNOSIS: non-small cell lung cancer, clinical T1 N0 M0, left lower lobe  PATHOLOGY:  2020 the patient underwent left lower lobe lung biopsy.  Pathology confirmed adenocarcinoma, lung origin.                              INTENT OF RADIOTHERAPY: undergoing hypofractionated RT.  CONCURRENT SYSTEMIC THERAPY: no     ONCOLOGIC HISTORY:     Ms. Mcmahon is a 80 year old female a diagnosis of non-small cell lung cancer, clinical T1 N0 M0, left lower lobe.      The patient has a history of multiple lung nodules which have been followed with serial imaging.  More recently the patient underwent a PET scan on 8/10/2020.  Imaging demonstrated increase in size of the left lower lobe mass from 1.3 cm to 1.7 cm.  Metabolic activity is also increased, previously 2.8 to 6.7 SUV.  There were also noted a small mildly active nodule in the left upper lobe, max SUV 3.0, previously 2.8 not significantly changed.  Scattered faint groundglass nodules were noted, were stable.  No lymphadenopathy  Or distant disease noted.  On 2020 the patient underwent left lower lobe lung biopsy.  Pathology confirmed adenocarcinoma, lung origin.  The patient was self referred to radiation oncology clinic by Dr. Crockett to discuss potential role of SBRT.     Of note, the patient has a longstanding diagnosis of CML.  She currently has progression of disease and will likely initiate systemic therapy once radiation therapy is completed.             SITE OF TREATMENT: Left lung    DATES  OF TREATMENT: 10/12/20-10/23/20    TOTAL DOSE OF TREATMENT: 5000 cGy    DOSE PER FRACTION OF TREATMENT: 500 cGy x 10 fractions       COMMENT/TOXICITY:    No acute trouble. No  respiratory change. Energy adequate.                PAIN MANAGEMENT:    none                       FOLLOW UP PLAN:  1.   Continue follow up with Dr. Crockett. Defer scans to medical oncology. Will plan to schedule telephone visit after scans to also discuss.     Radiation Oncologist: Aime Gan M.D.  Department of Radiation Oncology  [facility]

## 2021-01-01 ENCOUNTER — HEALTH MAINTENANCE LETTER (OUTPATIENT)
Age: 81
End: 2021-01-01

## (undated) DEVICE — CLIP HORIZON SM RED WIDE SLOT 001201

## (undated) DEVICE — LINEN TOWEL PACK X6 WHITE 5487

## (undated) DEVICE — SOL WATER IRRIG 1000ML BOTTLE 2F7114

## (undated) DEVICE — CLIP HORIZON MED BLUE 002200

## (undated) DEVICE — ESU CORD BIPOLAR AND IRR TUBING AESCULAP US355

## (undated) DEVICE — SUCTION SLEEVE NEPTUNE 2 125MM 0703-005-125

## (undated) DEVICE — PREP SKIN SCRUB TRAY 4461A

## (undated) DEVICE — SU SILK 2-0 SH CR 5X18" C0125

## (undated) DEVICE — PREP POVIDONE IODINE SCRUB 7.5% 4OZ APL82212

## (undated) DEVICE — SU SILK 2-0 TIE 12X30" A305H

## (undated) DEVICE — Device

## (undated) DEVICE — SU SILK 3-0 TIE 12X30" A304H

## (undated) DEVICE — NIM PROBE PRASS INCREMENTING TIP 8225825

## (undated) DEVICE — SU VICRYL 3-0 SH 8X18" UND J864D

## (undated) DEVICE — SUCTION MANIFOLD DORNOCH ULTRA CART UL-CL500

## (undated) DEVICE — SPONGE LAP 18X18" X8435

## (undated) DEVICE — SPONGE KITTNER 30-101

## (undated) DEVICE — SU ETHILON 3-0 PS-1 18" 1663H

## (undated) DEVICE — SU SILK 4-0 TIE 12X30" A303H

## (undated) DEVICE — SPONGE RAY-TEC 4X4" 7317

## (undated) DEVICE — PACK NEURO MINOR UMMC SNE32MNMU4

## (undated) DEVICE — DRSG TEGADERM 4X4 3/4" 1626W

## (undated) DEVICE — RETR ELASTIC STAYS LONE STAR BLUNT DUAL LEAD 3550-1G

## (undated) DEVICE — DRAIN JACKSON PRATT RESERVOIR 100ML SU130-1305

## (undated) DEVICE — DRAIN JACKSON PRATT 10MM FLAT 4/4 PERF SU130-1311

## (undated) DEVICE — SOL NACL 0.9% IRRIG 1000ML BOTTLE 2F7124

## (undated) DEVICE — LINEN TOWEL PACK X30 5481

## (undated) DEVICE — ESU GROUND PAD ADULT W/CORD E7507

## (undated) RX ORDER — PHENYLEPHRINE HCL IN 0.9% NACL 1 MG/10 ML
SYRINGE (ML) INTRAVENOUS
Status: DISPENSED
Start: 2018-08-07

## (undated) RX ORDER — DEXAMETHASONE SODIUM PHOSPHATE 4 MG/ML
INJECTION, SOLUTION INTRA-ARTICULAR; INTRALESIONAL; INTRAMUSCULAR; INTRAVENOUS; SOFT TISSUE
Status: DISPENSED
Start: 2018-08-07

## (undated) RX ORDER — CEFAZOLIN SODIUM 1 G/3ML
INJECTION, POWDER, FOR SOLUTION INTRAMUSCULAR; INTRAVENOUS
Status: DISPENSED
Start: 2018-08-07

## (undated) RX ORDER — HYDROMORPHONE HCL/0.9% NACL/PF 0.2MG/0.2
SYRINGE (ML) INTRAVENOUS
Status: DISPENSED
Start: 2018-08-07

## (undated) RX ORDER — FENTANYL CITRATE 50 UG/ML
INJECTION, SOLUTION INTRAMUSCULAR; INTRAVENOUS
Status: DISPENSED
Start: 2018-08-07

## (undated) RX ORDER — ONDANSETRON 2 MG/ML
INJECTION INTRAMUSCULAR; INTRAVENOUS
Status: DISPENSED
Start: 2018-08-07

## (undated) RX ORDER — ALBUMIN, HUMAN INJ 5% 5 %
SOLUTION INTRAVENOUS
Status: DISPENSED
Start: 2018-08-07

## (undated) RX ORDER — HYDROMORPHONE HYDROCHLORIDE 1 MG/ML
INJECTION, SOLUTION INTRAMUSCULAR; INTRAVENOUS; SUBCUTANEOUS
Status: DISPENSED
Start: 2018-08-07